# Patient Record
Sex: FEMALE | Race: OTHER | HISPANIC OR LATINO | Employment: FULL TIME | URBAN - METROPOLITAN AREA
[De-identification: names, ages, dates, MRNs, and addresses within clinical notes are randomized per-mention and may not be internally consistent; named-entity substitution may affect disease eponyms.]

---

## 2021-04-27 ENCOUNTER — APPOINTMENT (OUTPATIENT)
Dept: RADIOLOGY | Facility: MEDICAL CENTER | Age: 56
End: 2021-04-27
Payer: COMMERCIAL

## 2021-04-27 ENCOUNTER — OFFICE VISIT (OUTPATIENT)
Dept: URGENT CARE | Facility: MEDICAL CENTER | Age: 56
End: 2021-04-27
Payer: COMMERCIAL

## 2021-04-27 VITALS
HEIGHT: 63 IN | RESPIRATION RATE: 16 BRPM | OXYGEN SATURATION: 98 % | TEMPERATURE: 99.8 F | SYSTOLIC BLOOD PRESSURE: 162 MMHG | HEART RATE: 90 BPM | DIASTOLIC BLOOD PRESSURE: 90 MMHG

## 2021-04-27 DIAGNOSIS — M54.50 ACUTE BILATERAL LOW BACK PAIN WITHOUT SCIATICA: ICD-10-CM

## 2021-04-27 DIAGNOSIS — M54.50 ACUTE BILATERAL LOW BACK PAIN WITHOUT SCIATICA: Primary | ICD-10-CM

## 2021-04-27 LAB
SL AMB  POCT GLUCOSE, UA: NORMAL
SL AMB LEUKOCYTE ESTERASE,UA: NORMAL
SL AMB POCT BILIRUBIN,UA: NORMAL
SL AMB POCT BLOOD,UA: NORMAL
SL AMB POCT CLARITY,UA: CLEAR
SL AMB POCT COLOR,UA: YELLOW
SL AMB POCT KETONES,UA: NORMAL
SL AMB POCT NITRITE,UA: NORMAL
SL AMB POCT PH,UA: 6
SL AMB POCT SPECIFIC GRAVITY,UA: 1.01
SL AMB POCT URINE PROTEIN: NORMAL
SL AMB POCT UROBILINOGEN: 0.2

## 2021-04-27 PROCEDURE — 72100 X-RAY EXAM L-S SPINE 2/3 VWS: CPT

## 2021-04-27 PROCEDURE — 81002 URINALYSIS NONAUTO W/O SCOPE: CPT | Performed by: FAMILY MEDICINE

## 2021-04-27 PROCEDURE — 99213 OFFICE O/P EST LOW 20 MIN: CPT | Performed by: FAMILY MEDICINE

## 2021-04-27 RX ORDER — METHOCARBAMOL 500 MG/1
500 TABLET, FILM COATED ORAL 2 TIMES DAILY PRN
Qty: 20 TABLET | Refills: 0 | Status: SHIPPED | OUTPATIENT
Start: 2021-04-27

## 2021-04-27 NOTE — LETTER
April 27, 2021     Patient: Rose Abdalla   YOB: 1965   Date of Visit: 4/27/2021       To Whom It May Concern: It is my medical opinion that Rose Abdalla may return to work on 4/28/2021     If you have any questions or concerns, please don't hesitate to call           Sincerely,        Jadyn Mariscal MD    CC: No Recipients

## 2021-04-27 NOTE — PROGRESS NOTES
[facility]        NAME: Karyn Puente is a 55 y o  female  : 1965    MRN: [id number]  DATE: 2021  TIME: 4:09 PM    Assessment and Plan   Acute bilateral low back pain without sciatica [M54 5]  1  Acute bilateral low back pain without sciatica  POCT urine dip    XR spine lumbar 2 or 3 views injury    methocarbamol (ROBAXIN) 500 mg tablet         Patient Instructions       Follow up with PCP in 3-5 days  Proceed to  ER if symptoms worsen  Chief Complaint     Chief Complaint   Patient presents with    Back Pain     Pt c/o 10/10 back pain that began today when she woke up  Pt denies any injury  History of Present Illness        46-year-old female here today with acute severe low back pain that developed early this morning  She describes a centrally located, lumbar pain denies any numbness weakness of the lower extremity  Describes vague history of similar pain several months ago which resolved on its own  This morning, she took 1 Tylenol which helped reduce severity of pain  Describes a history of kidney stone diagnosed 4 5 years ago  Denies fever chills  Denies any recent heavy lifting or fall  However, 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  Review of Systems   Review of Systems   Musculoskeletal: Positive for arthralgias and back pain  Current Medications       Current Outpatient Medications:     methocarbamol (ROBAXIN) 500 mg tablet, Take 1 tablet (500 mg total) by mouth 2 (two) times a day as needed for muscle spasms, Disp: 20 tablet, Rfl: 0    Current Allergies     Allergies as of 2021    (No Known Allergies)            The following portions of the patient's history were reviewed and updated as appropriate: allergies, current medications, past family history, past medical history, past social history, past surgical history and problem list      History reviewed  No pertinent past medical history      History reviewed  No pertinent surgical history  No family history on file  Medications have been verified  Objective   /90   Pulse 90   Temp 99 8 °F (37 7 °C)   Resp 16   Ht 5' 3" (1 6 m)   SpO2 98%   No LMP recorded  Physical Exam     Physical Exam  Vitals signs and nursing note reviewed  Constitutional:       Appearance: Normal appearance  Abdominal:      General: Abdomen is flat  Bowel sounds are normal       Tenderness: There is no right CVA tenderness or left CVA tenderness  Musculoskeletal: Normal range of motion  Comments: LS spine: Flexion to 45 degrees, extension to 30 degrees, lateral rotation side bend 30 degrees  There is some tenderness upon palpation of the left sacroiliac joint  Extremities: Lower - good strength and tone, 5/5  Gait -normal    Neurological:      Mental Status: She is alert  Comments: Straight leg raise- negative    DTRs-2+

## 2022-02-13 ENCOUNTER — OFFICE VISIT (OUTPATIENT)
Dept: URGENT CARE | Age: 57
End: 2022-02-13
Payer: COMMERCIAL

## 2022-02-13 VITALS
RESPIRATION RATE: 20 BRPM | DIASTOLIC BLOOD PRESSURE: 90 MMHG | OXYGEN SATURATION: 98 % | HEIGHT: 66 IN | BODY MASS INDEX: 32.14 KG/M2 | SYSTOLIC BLOOD PRESSURE: 156 MMHG | WEIGHT: 200 LBS | TEMPERATURE: 97.8 F | HEART RATE: 74 BPM

## 2022-02-13 DIAGNOSIS — T78.40XA ALLERGIC REACTION, INITIAL ENCOUNTER: Primary | ICD-10-CM

## 2022-02-13 DIAGNOSIS — T78.3XXA ANGIOEDEMA, INITIAL ENCOUNTER: ICD-10-CM

## 2022-02-13 PROCEDURE — 99214 OFFICE O/P EST MOD 30 MIN: CPT | Performed by: PHYSICIAN ASSISTANT

## 2022-02-13 PROCEDURE — 96372 THER/PROPH/DIAG INJ SC/IM: CPT | Performed by: PHYSICIAN ASSISTANT

## 2022-02-13 RX ORDER — DIPHENHYDRAMINE HYDROCHLORIDE 50 MG/ML
50 INJECTION INTRAMUSCULAR; INTRAVENOUS ONCE
Status: DISCONTINUED | OUTPATIENT
Start: 2022-02-13 | End: 2022-02-13

## 2022-02-13 RX ORDER — METHYLPREDNISOLONE 4 MG/1
TABLET ORAL
Qty: 1 EACH | Refills: 0 | Status: SHIPPED | OUTPATIENT
Start: 2022-02-13

## 2022-02-13 RX ORDER — DIPHENHYDRAMINE HYDROCHLORIDE 50 MG/ML
50 INJECTION INTRAMUSCULAR; INTRAVENOUS ONCE
Status: COMPLETED | OUTPATIENT
Start: 2022-02-13 | End: 2022-02-13

## 2022-02-13 RX ORDER — METHYLPREDNISOLONE SODIUM SUCCINATE 125 MG/2ML
125 INJECTION, POWDER, LYOPHILIZED, FOR SOLUTION INTRAMUSCULAR; INTRAVENOUS ONCE
Status: COMPLETED | OUTPATIENT
Start: 2022-02-13 | End: 2022-02-13

## 2022-02-13 RX ADMIN — METHYLPREDNISOLONE SODIUM SUCCINATE 125 MG: 125 INJECTION, POWDER, LYOPHILIZED, FOR SOLUTION INTRAMUSCULAR; INTRAVENOUS at 15:33

## 2022-02-13 RX ADMIN — DIPHENHYDRAMINE HYDROCHLORIDE 50 MG: 50 INJECTION INTRAMUSCULAR; INTRAVENOUS at 15:33

## 2022-02-13 NOTE — PROGRESS NOTES
[facility]        NAME: Rose Abdalla is a 55 y o  female  : 1965    MRN: [id number]  DATE: 2022  TIME: 3:25 PM    Assessment and Plan   Allergic reaction, initial encounter [T78 40XA]  1  Allergic reaction, initial encounter  methylPREDNISolone sodium succinate (Solu-MEDROL) injection 125 mg    diphenhydrAMINE (BENADRYL) injection 50 mg    DISCONTINUED: diphenhydrAMINE (BENADRYL) injection 50 mg   2  Angioedema, initial encounter         Patient received Solu-Medrol 125mg and Benadryl 50mg IM while in clinic  Symptoms began to improve  ED if symptoms worsen  Patient Instructions       Follow up with PCP tomorrow for re evaluation  Proceed to  ER if symptoms worsen  Chief Complaint     Chief Complaint   Patient presents with    Lip Swelling     Lip Swelling x 2 days         History of Present Illness       Patient is a 55year old female [facility] with [facility] to upper lip  Patient reports eating Pineapple two days ago and swelling began shortly after  Pt took Benadryl upon initial notice of edema  Patient swelling has worsened through this morning  Patient denies any throat tightness, shortness of breath or difficulty with swallowing  No chest pain  Allergic Reaction  This is a new problem  The current episode started 2 days ago  The problem occurs constantly  The problem has been gradually worsening since onset  The problem is moderate  The patient was exposed to food (Pineapple)  Pertinent negatives include no abdominal pain, chest pain, coughing, difficulty breathing, drooling, eye redness, hyperventilation, rash, stridor, trouble swallowing, vomiting or wheezing  Swelling is present on the lips  Past treatments include diphenhydramine  The treatment provided no relief  Review of Systems   Review of Systems   Constitutional: Negative for chills and fever  HENT: Positive for sore throat   Negative for drooling, ear pain and trouble swallowing  Facial swelling: Lip swelling  Eyes: Negative for pain, redness and visual disturbance  Respiratory: Negative for cough, shortness of breath, wheezing and stridor  Cardiovascular: Negative for chest pain and palpitations  Gastrointestinal: Negative for abdominal pain and vomiting  Genitourinary: Negative for dysuria and hematuria  Musculoskeletal: Negative for arthralgias and back pain  Skin: Negative for color change and rash  Neurological: Negative for seizures and syncope  All other systems reviewed and are negative  Current Medications       Current Outpatient Medications:     methocarbamol (ROBAXIN) 500 mg tablet, Take 1 tablet (500 mg total) by mouth 2 (two) times a day as needed for muscle spasms, Disp: 20 tablet, Rfl: 0    Current Facility-Administered Medications:     diphenhydrAMINE (BENADRYL) injection 50 mg, 50 mg, Intramuscular, Once, Juana Garcia PA-C    methylPREDNISolone sodium succinate (Solu-MEDROL) injection 125 mg, 125 mg, Intramuscular, Once, Franklyn Shi PA-C    Current Allergies     Allergies as of 02/13/2022 - Reviewed 02/13/2022   Allergen Reaction Noted    Pineapple - food allergy Lip Swelling 02/13/2022            The following portions of the patient's history were reviewed and updated as appropriate: allergies, current medications, past family history, past medical history, past social history, past surgical history and problem list      History reviewed  No pertinent past medical history  History reviewed  No pertinent surgical history  History reviewed  No pertinent family history  Medications have been verified  Objective   /90   Pulse 74   Temp 97 8 °F (36 6 °C)   Resp 20   Ht 5' 6" (1 676 m)   Wt 90 7 kg (200 lb)   SpO2 98%   BMI 32 28 kg/m²   No LMP recorded  Physical Exam     Physical Exam  Constitutional:       Appearance: Normal appearance  HENT:      Head: Normocephalic and atraumatic  Nose: Nose normal       Mouth/Throat:      Mouth: Mucous membranes are moist      Eyes:      Extraocular Movements: Extraocular movements intact  Conjunctiva/sclera: Conjunctivae normal       Pupils: Pupils are equal, round, and reactive to light  Cardiovascular:      Rate and Rhythm: Normal rate  Pulmonary:      Effort: Pulmonary effort is normal    Musculoskeletal:         General: Normal range of motion  Cervical back: Normal range of motion and neck supple  Skin:     General: Skin is warm and dry  Capillary Refill: Capillary refill takes less than 2 seconds  Neurological:      General: No focal deficit present  Mental Status: She is alert and oriented to person, place, and time     Psychiatric:         Mood and Affect: Mood normal          Behavior: Behavior normal

## 2022-02-13 NOTE — LETTER
February 13, 2022     Patient: Savannah Lopez   YOB: 1965   Date of Visit: 2/13/2022       To Whom It May Concern: It is my medical opinion that Savannah Lopez should not return to work until 02/15/2022  If you have any questions or concerns, please don't hesitate to call           Sincerely,        Gustavo Chavarria PA-C    CC: No Recipients

## 2022-02-13 NOTE — PATIENT INSTRUCTIONS
Reacción alérgica general   LO QUE NECESITA SABER:   Nathaniel reacción alérgica es la respuesta de cross cuerpo a un alérgeno  Los alérgenos [employer], alimentos, picaduras de insectos, la caspa de los [place], el moho, el látex, los químicos y los ácaros del polvo  El polen de los [place], [place] césped y las hierbas malas también pueden causar nathaniel reacción alérgica  Nathaniel reacción alérgica puede variar de leve a severa  INSTRUCCIONES SOBRE [facility]:   Llame al 911 en reinaldo de presentar signos o síntomas de anafilaxia, echo dificultad para respirar, inflamación en cross boca o garganta, o sibilancias  También es posible que tenga comezón, sarpullido, urticaria o sienta que se va a desmayar  Regrese a la maria esther de emergencias si:  · Tiene sarpullido, urticaria, hinchazón o picazón está empezando a empeorar  · Cross garganta se siente apretada o kevin labios o lengua se inflaman  · Usted tiene dificultad para tragar o hablar  · Usted tiene náuseas, diarrea o calambres abdominales que [place], o tiene vómitos  · Usted tiene dolor o presión en el pecho  Comuníquese con cross médico si:  · Usted tiene preguntas o inquietudes acerca de cross condición o cuidado  Medicamentos: Es posible que usted necesite alguno de los siguientes:  · Los medicamentos para aliviar ciertos síntomas de las [place], echo la comezón, los estornudos y la inflamación  Usted los puede tessa en forma de píldora o de gotas en kevin ojos o nariz  Los tratamientos tópicos pueden darse para aplicarse directamente en cross piel para ayudar a disminuir la comezón o la inflamación  · La epinefrina podría recetarse si tiene riesgo de anafilaxia  Se trata de nathaniel reacción alérgica severa que puede ser potencialmente mortal  Cross médico le indicará si necesita llevar epinefrina con usted  Gale Browner cuándo y cómo usarla  · [place] kevin medicamentos echo se le haya indicado   Consulte con cross médico si usted mikal que cross medicamento no le está ayudando o si presenta efectos secundarios  Infórmele si es alérgico a algún medicamento  Mantenga nathaniel lista actualizada de los [place], las vitaminas y los productos herbales que roro  Incluya los siguientes datos de los medicamentos: cantidad, frecuencia y motivo de administración  Traiga con usted la lista o los envases de las píldoras a kevin citas de seguimiento  Lleve la lista de los medicamentos con usted en reinaldo de nathaniel emergencia  Acuda a la consulta de control con cross médico según las indicaciones: Anote kevin preguntas para que se acuerde de hacerlas chandra kevin visitas  El manejo de kevin síntomas:  · Evite los alérgenos  Es posible que necesite hacerse pruebas de alergias con cross médico o con un especialista para encontrar los alérgenos  · Use compresas frías en la piel o los ojos  Artemus ayudará a calmar la piel o los ojos afectados por la reacción alérgica  Puede hacer nathaniel compresa fría empapando un paño en agua fría  Escurra el exceso de agua antes de aplicar el paño  · Enjuague kevin fosas nasales con nathaniel solución salina  El enjuague diario puede ayudar a mantener cross nariz daniel de alérgenos  Use agua destilada, si es posible  Puede también hervir agua del grifo y [place] dejar que se enfríe antes de usarla  No use agua del grifo sin hervirla yolande  · No fume  La nicotina y otros químicos en los cigarrillos y cigarros pueden empeorar la reacción alérgica y también pueden causar daño a los pulmones  Pida información a cross médico si usted actualmente fuma y necesita ayuda para dejar de fumar  Los cigarrillos electrónicos o el tabaco sin humo igualmente contienen nicotina  Consulte con cross médico antes de [employer]  © Copyright [facility] 2021 Information is for End User's use only and may not be sold, redistributed or otherwise used for commercial purposes   All illustrations and images included in CareNotes® are the copyrighted property of [employer] , [employer]  or [employer] [facility]  Esta información es sólo para uso en educación  Cross intención no es darle un consejo médico sobre enfermedades o tratamientos  Colsulte con cross [employer] farmacéutico antes de seguir cualquier régimen médico para saber si es seguro y efectivo para usted

## 2022-10-03 ENCOUNTER — APPOINTMENT (OUTPATIENT)
Dept: LAB | Facility: CLINIC | Age: 57
End: 2022-10-03
Payer: COMMERCIAL

## 2022-10-03 ENCOUNTER — OFFICE VISIT (OUTPATIENT)
Dept: FAMILY MEDICINE CLINIC | Facility: CLINIC | Age: 57
End: 2022-10-03

## 2022-10-03 VITALS
WEIGHT: 186 LBS | HEIGHT: 66 IN | BODY MASS INDEX: 29.89 KG/M2 | HEART RATE: 85 BPM | SYSTOLIC BLOOD PRESSURE: 144 MMHG | RESPIRATION RATE: 16 BRPM | DIASTOLIC BLOOD PRESSURE: 86 MMHG | OXYGEN SATURATION: 99 % | TEMPERATURE: 97 F

## 2022-10-03 DIAGNOSIS — Z12.31 ENCOUNTER FOR SCREENING MAMMOGRAM FOR MALIGNANT NEOPLASM OF BREAST: ICD-10-CM

## 2022-10-03 DIAGNOSIS — Z12.11 COLON CANCER SCREENING: ICD-10-CM

## 2022-10-03 DIAGNOSIS — I10 HYPERTENSION, UNSPECIFIED TYPE: ICD-10-CM

## 2022-10-03 DIAGNOSIS — E66.9 OBESITY (BMI 30-39.9): ICD-10-CM

## 2022-10-03 DIAGNOSIS — L60.9 ABNORMALITY OF NAIL SURFACE: ICD-10-CM

## 2022-10-03 DIAGNOSIS — Z76.89 ENCOUNTER TO ESTABLISH CARE: Primary | ICD-10-CM

## 2022-10-03 DIAGNOSIS — J02.9 SORE THROAT: ICD-10-CM

## 2022-10-03 LAB
CHOLEST SERPL-MCNC: 165 MG/DL
CREAT UR-MCNC: 106 MG/DL
EST. AVERAGE GLUCOSE BLD GHB EST-MCNC: 123 MG/DL
HBA1C MFR BLD: 5.9 %
HDLC SERPL-MCNC: 41 MG/DL
LDLC SERPL CALC-MCNC: 99 MG/DL (ref 0–100)
MICROALBUMIN UR-MCNC: 113 MG/L (ref 0–20)
MICROALBUMIN/CREAT 24H UR: 107 MG/G CREATININE (ref 0–30)
TRIGL SERPL-MCNC: 124 MG/DL

## 2022-10-03 PROCEDURE — 82043 UR ALBUMIN QUANTITATIVE: CPT | Performed by: STUDENT IN AN ORGANIZED HEALTH CARE EDUCATION/TRAINING PROGRAM

## 2022-10-03 PROCEDURE — 99213 OFFICE O/P EST LOW 20 MIN: CPT | Performed by: FAMILY MEDICINE

## 2022-10-03 PROCEDURE — 36415 COLL VENOUS BLD VENIPUNCTURE: CPT

## 2022-10-03 PROCEDURE — 82570 ASSAY OF URINE CREATININE: CPT | Performed by: STUDENT IN AN ORGANIZED HEALTH CARE EDUCATION/TRAINING PROGRAM

## 2022-10-03 PROCEDURE — 83036 HEMOGLOBIN GLYCOSYLATED A1C: CPT

## 2022-10-03 PROCEDURE — 80061 LIPID PANEL: CPT

## 2022-10-03 RX ORDER — GUAIFENESIN 100 MG/5ML
200 SYRUP ORAL 3 TIMES DAILY PRN
Qty: 120 ML | Refills: 0 | Status: SHIPPED | OUTPATIENT
Start: 2022-10-03

## 2022-10-03 NOTE — PATIENT INSTRUCTIONS
CereVe Facial Moisturizer     La forma de tessa la presión arterial   LO QUE NECESITA SABER:   La presión arterial es la fuerza de la blanche empujando contra las saunders de las arterias  Los resultados de la presión arterial se escriben echo 2 números  El yolande, o número de [place], se llama presión arterial sistólica  Esta es la presión causada por el corazón que empuja la blanche hacia el cuerpo  El [place], o número de [place], es la presión arterial diastólica  Esta es la presión cuando el corazón se relaja y se llena de [place]  Pregúntele a cross médico cuál debe ser cross presión arterial  En la mayoría de las personas, nathaniel buena meta de presión arterial es menos de 120/80  INSTRUCCIONES SOBRE [facility]:   Llame a cross médico si:  Cross presión arterial está más elevada o más baja de lo que se le napoles indicado que debe estar  Usted tiene preguntas o inquietudes acerca de cross condición o cuidado  Por qué se debe tessa cross presión arterial: Es posible que usted no tenga ningún signo o síntoma de presión arterial jameson  Es posible que necesite tomarse la presión arterial regularmente para saber con qué frecuencia es jameson  La presión arterial jameson aumenta cross riesgo de derrame cerebral, ataque cardíaco o enfermedad renal  Es posible que necesite tessa un medicamento para mantener la presión arterial a un nivel normal  Anote y lleve un registro de cross presión arterial  Cross médico puede utilizar los [place] de la presión arterial de cross registro para abdiel si kevin medicamentos para la presión arterial están funcionando  La frecuencia que debe tessa cross presión arterial: Cross médico le puede recomendar que usted se tome la presión arterial al menos 2 veces al día  Tómese la presión arterial a la misma hora todos los días, por ejemplo nathaniel en la mañana y la otra en la noche   Pregúntele a cross médico cuándo y con qué frecuencia debería medir cross presión arterial   Cómo tomarse la presión arterial: Usted puede tomarse cross presión en casa con un monitor digital para la presión arterial o tensiómetro  Se recomienda leer las instrucciones que vienen con el tensiómetro  El monitor viene con un brazalete ajustable  Pregunte a cross médico si el brazalete es del tamaño correcto  No coma, cinda, fume o gaviota ejercicio chandra 30 minutos antes de tomarse la presión arterial     Siéntese y descanse por 5 minutos antes de tomarse la presión arterial     Siéntese con los pies planos en el piso y la espalda contra nathaniel silla  Extienda cross brazo y apóyelo en nathaniel superficie plana  Cross brazo debe estar a la misma altura que cross corazón  Asegúrese de que el manguito esté totalmente desinflado  Coloque el manguito de presión arterial contra la piel desnuda a aproximadamente 1 pulgada (2,5 cm) por encima del codo  Coloque la rivas alrededor del brazo de modo que [place]  La lectura de la presión arterial podría ser incorrecta si la abrazadera está muy floja o suelta  Si está usando nathaniel [place], envuelva el brazalete cómodamente alrededor de la [place] [address]  Mantenga la [place] [address] en el mismo nivel que cross corazón  Encienda el monitor de la presión arterial y Eason Marialuisa instrucciones  Anote la lectura de cross presión arterial, la fecha, la hora y en qué brazo se tomó la presión  Tómese la presión arterial 2 veces y escriba ambas lecturas  Use el mismo brazo cada vez  Estas lecturas pueden ser tomadas con 1 minuto de diferencia  [place] necesita saber:  No tome la presión arterial en un brazo lesionado, o si tiene nathaniel sonda intravenosa o nathaniel derivación  Tómese kevin medicamentos para la presión arterial echo se le indique  No deje de tessa kevin medicamentos si cross presión arterial está en cross valor objetivo  Si tiene la presión arterial en el valor objetivo significa que el medicamento está funcionando correctamente      Acuda a la consulta de control con cross médico según las indicaciones: Lleve el registro de kevin lecturas de presión arterial  Zetta Jero también el aparato para medir la presión arterial  Los médicos pueden comprobar que usted está usando el aparato correctamente  Anote kevin preguntas para que se acuerde de hacerlas chandra kevin visitas  © Copyright [address] 2022 Information is for End User's use only and may not be sold, redistributed or otherwise used for commercial purposes  All illustrations and images included in CareNotes® are the copyrighted property of A D A M , Inc  or [address] [place] [address] es sólo para uso en educación  Cross intención no es darle un consejo médico sobre enfermedades o tratamientos  Colsulte con cross Verlon Riley farmacéutico antes de seguir cualquier régimen médico para saber si es seguro y efectivo para usted

## 2022-10-03 NOTE — ASSESSMENT & PLAN NOTE
Abnormally thickened nailbeds on bilateral feet (please see media below)    - Ambulatory referral to Podiatry

## 2022-10-03 NOTE — ASSESSMENT & PLAN NOTE
-CRC screening: No personal or family history of colon cancer or colon polyps  No hx of colon cancer screening  Referral to GI for colonoscopy as per CDC guidelines   -BrCa screening: There is no personal or family history of breast cancer  She denies finding new breast lumps, breast pain or nipple discharge  Referral for mammogram at 51 as per USPSTF  -Cervical cancer screening: PAP performed 03/2020, ASCUS positive, recommendation to repeat pap in 2021   Patient to RTC for repeat screening    -Smoking hx: never smoker

## 2022-10-03 NOTE — ASSESSMENT & PLAN NOTE
/86 - currently above goal   Not currently on any pharmacotherapy    Microalb/Cr ratio: not on file, will order  Statin currently used: No  Current Alcohol Usage: Yes, occasionally  Current Cigarette smoker: No, never smoker   Currently Physical Active: only at work, works at [employer] with focus on low salt intake  Limit alcohol intake  - Encouraged home blood pressure monitoring  Provided patient with script for Blood pressure kit  Advised to take daily readings for 2 weeks and present with recorded values at follow up visit  - Reviewed BP goals with patient  Goal BP <140/90 per JNC 8 guidelines     - Will schedule patient for nursing visit for BP check in two weeks

## 2022-10-03 NOTE — PROGRESS NOTES
Assessment/Plan:    Encounter to establish care  -CRC screening: No personal or family history of colon cancer or colon polyps  No hx of colon cancer screening  Referral to GI for colonoscopy as per CDC guidelines   -BrCa screening: There is no personal or family history of breast cancer  She denies finding new breast lumps, breast pain or nipple discharge  Referral for mammogram at 51 as per USPSTF  -Cervical cancer screening: PAP performed 03/2020, ASCUS positive, recommendation to repeat pap in 2021  Patient to RTC for repeat screening    -Smoking hx: never smoker     High blood pressure  /86 - currently above goal   Not currently on any pharmacotherapy    Microalb/Cr ratio: not on file, will order  Statin currently used: No  Current Alcohol Usage: Yes, occasionally  Current Cigarette smoker: No, never smoker   Currently Physical Active: only at work, works at [employer] with focus on low salt intake  Limit alcohol intake  - Encouraged home blood pressure monitoring  Provided patient with script for Blood pressure kit  Advised to take daily readings for 2 weeks and present with recorded values at follow up visit  - Reviewed BP goals with patient  Goal BP <140/90 per JNC 8 guidelines  - Will schedule patient for nursing visit for BP check in two weeks      Abnormality of nail surface  Abnormally thickened nailbeds on bilateral feet (please see media below)    - Ambulatory referral to Podiatry       Return in about 2 weeks (around 10/17/2022) for Blood pressure   Diagnoses and all orders for this visit:    Encounter to establish care    Encounter for screening mammogram for malignant neoplasm of breast  -     Mammo screening bilateral w 3d & cad; Future    Hypertension, unspecified type  -     Microalbumin / creatinine urine ratio    Obesity (BMI 30-39 9)  -     Lipid Panel with Direct LDL reflex;  Future  -     Hemoglobin A1C; Future    Abnormality of nail surface  - Ambulatory Referral to Podiatry; Future    Colon cancer screening  -     Ambulatory referral for colonoscopy; Future    Sore throat  -     guaiFENesin (ROBITUSSIN) 100 MG/5ML oral liquid; Take 10 mL (200 mg total) by mouth 3 (three) times a day as needed for cough          Subjective:     Suzan Payan is a 65 y o  female who  has no past medical history on file  who presented to the office today to establish care  HPI      Patient presents from [facility] - she reports she has been traveling back and forth between the  and [facility] for several years  PMH: patient denies any history of chronic disease  Medications: None   Allergies: Pineapple, oral rash  Surg Hx: None   Social Hx: never smoker, no drugs, occasional alcohol   Fam Hx: Diabetes in mother       Current Concerns: thickened nail      Review of Systems   Constitutional: Negative for chills and fever  HENT: Negative for congestion, ear pain, rhinorrhea and sinus pain  Eyes: Negative for visual disturbance  Respiratory: Negative for chest tightness, shortness of breath and wheezing  Cardiovascular: Negative for chest pain and palpitations  Gastrointestinal: Negative for abdominal pain, constipation, diarrhea and vomiting  Endocrine: Negative for polyuria  Genitourinary: Negative for dysuria  Musculoskeletal: Negative for arthralgias and myalgias  Neurological: Negative for dizziness, syncope and light-headedness  Psychiatric/Behavioral: Negative for hallucinations, self-injury and suicidal ideas  Objective:    /86 (BP Location: Right arm, Patient Position: Sitting, Cuff Size: Large)   Pulse 85   Temp (!) 97 °F (36 1 °C) (Temporal)   Resp 16   Ht 5' 6" (1 676 m)   Wt 84 4 kg (186 lb)   SpO2 99%   Breastfeeding No   BMI 30 02 kg/m²     Physical Exam  Constitutional:       Appearance: Normal appearance  HENT:      Head: Normocephalic and atraumatic        Nose: Nose normal  No congestion or rhinorrhea  Mouth/Throat:      Mouth: Mucous membranes are moist       Pharynx: Oropharynx is clear  No oropharyngeal exudate or posterior oropharyngeal erythema  Eyes:      Conjunctiva/sclera: Conjunctivae normal    Cardiovascular:      Rate and Rhythm: Normal rate and regular rhythm  Heart sounds: Normal heart sounds  Pulmonary:      Effort: Pulmonary effort is normal       Breath sounds: Normal breath sounds  Abdominal:      General: Bowel sounds are normal    Musculoskeletal:         General: Normal range of motion  Cervical back: Normal range of motion  Neurological:      Mental Status: She is alert and oriented to person, place, and time  Psychiatric:         Mood and Affect: Mood normal          Behavior: Behavior normal          Thought Content:  Thought content normal          Judgment: Judgment normal              Mariana Mcgrath  10/03/22  5:43 PM

## 2022-10-09 ENCOUNTER — TELEPHONE (OUTPATIENT)
Dept: FAMILY MEDICINE CLINIC | Facility: CLINIC | Age: 57
End: 2022-10-09

## 2022-10-13 ENCOUNTER — ANNUAL EXAM (OUTPATIENT)
Dept: FAMILY MEDICINE CLINIC | Facility: CLINIC | Age: 57
End: 2022-10-13

## 2022-10-13 ENCOUNTER — APPOINTMENT (OUTPATIENT)
Dept: LAB | Facility: CLINIC | Age: 57
End: 2022-10-13
Payer: COMMERCIAL

## 2022-10-13 VITALS
DIASTOLIC BLOOD PRESSURE: 94 MMHG | WEIGHT: 188.4 LBS | HEIGHT: 66 IN | RESPIRATION RATE: 18 BRPM | SYSTOLIC BLOOD PRESSURE: 138 MMHG | TEMPERATURE: 98.2 F | BODY MASS INDEX: 30.28 KG/M2 | HEART RATE: 87 BPM | OXYGEN SATURATION: 98 %

## 2022-10-13 DIAGNOSIS — Z01.419 ENCOUNTER FOR GYNECOLOGICAL EXAMINATION WITHOUT ABNORMAL FINDING: Primary | ICD-10-CM

## 2022-10-13 DIAGNOSIS — Z11.3 SCREENING EXAMINATION FOR STD (SEXUALLY TRANSMITTED DISEASE): ICD-10-CM

## 2022-10-13 DIAGNOSIS — Z00.00 ANNUAL PHYSICAL EXAM: ICD-10-CM

## 2022-10-13 DIAGNOSIS — R73.03 PREDIABETES: ICD-10-CM

## 2022-10-13 DIAGNOSIS — I10 HYPERTENSION, UNSPECIFIED TYPE: ICD-10-CM

## 2022-10-13 LAB
ANION GAP SERPL CALCULATED.3IONS-SCNC: 5 MMOL/L (ref 4–13)
BUN SERPL-MCNC: 9 MG/DL (ref 5–25)
CALCIUM SERPL-MCNC: 9.5 MG/DL (ref 8.3–10.1)
CHLORIDE SERPL-SCNC: 103 MMOL/L (ref 96–108)
CO2 SERPL-SCNC: 27 MMOL/L (ref 21–32)
CREAT SERPL-MCNC: 0.8 MG/DL (ref 0.6–1.3)
GFR SERPL CREATININE-BSD FRML MDRD: 82 ML/MIN/1.73SQ M
GLUCOSE P FAST SERPL-MCNC: 93 MG/DL (ref 65–99)
HAV IGM SER QL: NORMAL
HBV CORE IGM SER QL: NORMAL
HBV SURFACE AG SER QL: NORMAL
HCV AB SER QL: NORMAL
POTASSIUM SERPL-SCNC: 4.2 MMOL/L (ref 3.5–5.3)
SODIUM SERPL-SCNC: 135 MMOL/L (ref 135–147)

## 2022-10-13 PROCEDURE — 87591 N.GONORRHOEAE DNA AMP PROB: CPT

## 2022-10-13 PROCEDURE — 86592 SYPHILIS TEST NON-TREP QUAL: CPT

## 2022-10-13 PROCEDURE — 80048 BASIC METABOLIC PNL TOTAL CA: CPT

## 2022-10-13 PROCEDURE — 87491 CHLMYD TRACH DNA AMP PROBE: CPT

## 2022-10-13 PROCEDURE — 80074 ACUTE HEPATITIS PANEL: CPT

## 2022-10-13 PROCEDURE — G0145 SCR C/V CYTO,THINLAYER,RESCR: HCPCS | Performed by: STUDENT IN AN ORGANIZED HEALTH CARE EDUCATION/TRAINING PROGRAM

## 2022-10-13 PROCEDURE — 36415 COLL VENOUS BLD VENIPUNCTURE: CPT

## 2022-10-13 PROCEDURE — G0476 HPV COMBO ASSAY CA SCREEN: HCPCS | Performed by: STUDENT IN AN ORGANIZED HEALTH CARE EDUCATION/TRAINING PROGRAM

## 2022-10-13 PROCEDURE — 87389 HIV-1 AG W/HIV-1&-2 AB AG IA: CPT

## 2022-10-13 RX ORDER — LISINOPRIL 10 MG/1
10 TABLET ORAL DAILY
Qty: 90 TABLET | Refills: 0 | Status: SHIPPED | OUTPATIENT
Start: 2022-10-13

## 2022-10-13 NOTE — ASSESSMENT & PLAN NOTE
Continue same  Finish up meds  Improving slowly  Pt still refusing any tests   RTC in 1 week Patient presents with home blood pressure values which have ranged between 475-521 systolic and  diastolic  Blood pressure readings were taken while patient was seated and relaxed at various times throughout the day  Will initiate trial of Lisinopril 10 mg QD with plans to titrate as needed  Will check BMP in 1 month  RTC in 1 month for HTN f/u

## 2022-10-13 NOTE — ASSESSMENT & PLAN NOTE
HA1C (10/3/22) 5 9, in prediabetic range  Last microalbumin/creatinine ratio was on 10/3/22 and elevated at 107  BP persistently elevated      -Will start ACE daily for renal protection   -Repeat Test for microalbuminuria in 3 months   -Follow up in 4 months for diabetes check up and repeat A1C  -Additional information, including diet and exercise recommendations, provided in AVS

## 2022-10-13 NOTE — PATIENT INSTRUCTIONS
Plan de alimentación con "enfoque dietético para detener la hipertensión” (DASH, por kevin siglas en inglés)   LO QUE NECESITA SABER:   El plan de alimentación DASH está diseñado para ayudar a prevenir o disminuir la hipertensión  También puede ayudar a bajar el colesterol guero (colesterol LDL) y disminuir cross riesgo de enfermedad cardíaca  El plan es bajo en sodio, azúcar, grasas dañinas, y grasas en cross totalidad  Es alto en potasio, calcio, magnesio y [place]  Estos nutrientes se agregan al consumir más frutas, vegetales y granos enteros  Con el plan de alimentación DASH, usted necesita consumir un número específico de porciones de cada henry de alimentos  [place] le ayudará a consumir las cantidades suficientes de ciertos nutrientes y limitar otros  La cantidad de porciones que usted debe comer depende de la cantidad de calorías que usted necesita  Cross dietista puede ayudarlo a crear planes de comidas con la cantidad [place] de porciones para cada henry de alimentos  INSTRUCCIONES SOBRE EL [facility]ITAL[facility]:   Lo que necesita saber acerca del sodio: Cross dietista le indicará la cantidad de sodio que usted debe consumir a diario  La gente que tiene la presión arterial jameson debe consumir de 1,500 a 2,300 mg de sodio al día echo eran  Nathaniel cucharadita (cdta) de sal tiene 2,300 mg de sodio  [place] puede parecer echo nathaniel meta difícil, akash pequeños cambios en los alimentos que usted consume pueden hacer nathaniel gran diferencia  Cross médico o dietista puede ayudarlo a crear un plan alimenticio que cumpla cross límite de sodio  Celina las etiquetas de los alimentos  Las etiquetas pueden ayudarle a escoger alimentos bajos en sodio  La cantidad de sodio está incluida en miligramos (mg)  La columna del porcentaje de valor diario indica la cantidad de necesidades diarias satisfechas con 1 porción del alimento para cada nutriente en la lista  Escoja alimentos que tengan menos de 5% del porcentaje diario de [place]   Estos alimentos se consideran bajos en sodio  Los alimentos que tienen 20% o más del porcentaje diario de sodio se consideran alimentos altos en sodio  Evite alimentos que tengan más de 300 mg de sodio por porción  Escoja alimentos Zenon Dragon diga que son bajos en sodio, con sodio reducido, o sin sal agregada  Limite la sal agregada  No sale la comida en la adan si se añade sal al cocinar  Use hierbas y condimentos, echo cebollas, ajo y especias sin sal para agregar sabor  Use jugo de lima, carole o vinagre para agregar un sabor ácido  Use chiles picantes o nathaniel cantidad pequeña de salsa picante para agregar un sabor picante  Limite los alimentos con alto contenido de sal agregada, echo los siguientes:    Condimentos hechos con sal, echo sal de ajo, sal de apio, sal de cebolla, sal condimentada, suavizantes para rolando, y glutamato de monosodio (MSG, por kevin siglas en inglés)    Sopa Miso y mezclas para sopa enlatadas o secas    Salsa de soya regular, salsa de [address], [address] [facility], salsa para [facility], [address] [facility], y la mayoría de las vinagres con sabor    Alimentos para merendar, echo mario tostadas, palomitas de [place], pretzels, piel de cerdo, galletas de soda [facility], y nueces [employer], echo cenas, entradas, vegetales en salsa, y rolando [employer] sustitutos para la sal  Pregúntele a cross médico si es posible usar sustitutos de la sal  Algunos sustitutos de la sal vienen con ingredientes que pueden ser dañinos si usted tiene ciertos padecimientos médicos  Escoja los alimentos cuidadosamente cuando sale a comer a restaurantes: las comidas de los restaurantes, sobre todo restaurantes de comida rápida, rosemary siempre son altas en sodio  Algunos restaurantes ofrecen información nutricional que indica la cantidad de sodio en kevin alimentos   Pida que preparen kevin comidas con menos sal o sin sal     Lo que necesita saber acerca de las grasas: Las grasas insaturadas y los ácidos grasos omega-3 son ejemplos de grasas saludables  Las grasas no saludables incluyen las grasas saturadas y las grasas trans  Incluya grasas saludables, echo las siguientes:     Aceites de cocina, echo el de soja, canola, brito o girasol    Pescados grasos, echo el salmón, el atún, la caballa o las dany    Aceite de linaza o linaza molida    ½ taza de frijoles cocidos, echo frijoles negros, frijoles rojos o frijoles pintos    1½ onzas de kenny secos bajos en sodio, echo almendras o nueces    [place] de maní baja en azúcar y sodio    [place], echo las de chía o girasol       Limite o no consuma grasas poco saludables, echo los siguientes:     Alimentos que contienen grasa de origen animal, echo las rolando grasas, la [place], la [place] y la crema    Agia Thekla, margarina en consuelo, aceite de park y aceite de иван     Aderezo de ensalada completo o cremoso    Sopa cremosa    Galletas, mario fritas y productos de panadería elaborados con margarina o manteca    Alimentos fritos con grasas poco saludables    Salsa de carne y salsas, echo la [place] o la de queso    Lo que necesita saber acerca de los carbohidratos: Todos los carbohidratos se descomponen en azúcar  Los carbohidratos complejos contienen más fibra que los simples  [place] significa que los carbohidratos complejos entran en el torrente sanguíneo más lentamente y causan menos picos de azúcar en la blanche  Intenta incluir más carbohidratos complejos y menos carbohidratos simples    Incluya carbohidratos complejos, echo los siguientes:     1 tajada de pan integral    1 onza de cereal seco que no contenga azúcar añadida    ½ taza de alexsander cocida    2 onzas de pasta integral cocida    ½ taza de arroz integral cocido    Limite o no consuma carbohidratos simples, echo los siguientes:     Productos horneados, echo rosquillas, pasteles y galletas    Mezclas para pan de maíz y galletas    Arroz arita y mezclas de pasta, echo los [place] con queso de caja    Cereales instantáneos y fríos que contienen azúcar    Asher Penning y helado que contienen azúcar    Condimentos, echo el ketchup    Bebidas con alto contenido en azúcar, echo refrescos, limonadas y jugos de frutas    Lo que usted necesita saber sobre las verduras y las frutas: Las verduras y las frutas pueden ser frescas, congeladas o enlatadas  Si es posible, trate de elegir opciones enlatadas bajas en sodio  Incluya nathaniel variedad de verduras y frutas, echo las siguientes:     1 Corpus leroy, anjali o melocotón medianos (aproximadamente ½ taza picada)    ½ banana pequeña    ½ taza de bayas, echo arándanos, fresas o moras    1 taza de verduras de hoja ladi crudas, echo [place], espinacas, col rizada o berza    ½ taza de verduras congeladas o enlatadas (sin sal agregada), echo judías verdes    ½ taza de fruta fresca, congelada o enlatada (enlatada en sirope liviano o jugo de fruta)    ½ taza de jugo de verduras o frutas    Limite o no consuma verduras y frutas elaboradas de las siguientes maneras:     [place] congelada, echo las cerezas, con azúcar añadida    Frutas en crema o salsa de [place]    Las verduras enlatadas son altas en sodio  Sauerkraut, vegetales en escabeche, y otros alimentos preparados con vinagre    Vegetales fritos o vegetales en mantequ[place] o salsas altas en grasas    Lo que necesita saber acerca de los alimentos con proteína:   Incluya alimentos proteicos magros o bajos en grasa, echo los siguientes:     Joanne evangelist (maryellen, Hussein) sin piel    Pescado (sobre todo pescado con grasa, echo salmón, atún fresco o caballa)    [place] de res o de cerdo magra (fern, carne molida extra Abraham Ruth)    Claras de [place] y sustitutos del huevo    1 taza de leche descremada o leche 1%    1½ onzas de queso descremado o bajo en grasas    6 onzas de yogurt descremado o bajo en grasas    Limite o no consuma alimentos con alto contenido de proteínas, [facility] siguientes     [address] o [place], echo carne preparada con maíz, tocineta, jamón, perros calientes, y salchichas    Frijoles enlatados y joanne enlatadas o en pasta, echo joanne en conserva, dany, anchoas y [address] de [address] para emparedado, echo [place], [place], [place], y carne en rebanada    Joanne altas en grasas (biste estilo T-bone, carne molida para hamburguesas, costillas)    Huevos enteros y yemas de [place]    Leche [place], [place] al 2% y crema    Queso normal y queso fundido    Otras pautas que debe seguir:  Mantenga un peso saludable  Cross riesgo de enfermedad cardíaca es aún más alto si usted tiene sobrepeso  Cross médico podría sugerirle que adelgace si tiene sobrepeso  Usted puede perder peso si se propone consumir menos calorías y alimentos que tengan azúcar y grasas agregadas  El plan de alimentación DASH puede ayudarle a lograrlo  Jania buena forma de disminuir el consumo de calorías es consumiendo porciones más pequeñas en cada comida y menos meriendas entre comidas  Consulte a cross médico para obtener más información sobre cómo adelgazar  Realice actividad física con regularidad  El ejercicio regular puede ayudarle a alcanzar o mantener un peso saludable  El ejercicio regular también puede ayudarle a disminuir cross presión arterial y mejorar kevin niveles de colesterol  Duglas ejercicios moderados por 30 minutos o más todos los días de la [place]  Para bajar peso, asegúrese de ejercitarse por lo menos 60 minutos  Consulte con cross médico sobre un programa de ejercicio adecuado para usted  Limite el consumo de alcohol  Las mujeres deberían limitar el consumo de alcohol a 1 bebida por día  Los hombres deberían limitar el consumo de alcohol a 2 tragos al día  Un trago equivale a 12 onzas de cerveza, 5 onzas de vino o 1 onza y ½ de licor  Para más información:  [facility] and [id number]  [place]eth [place]ke[place] [id number]  Phone: [phone]  Web Address: ItCheaper no    © Copyright [employer] 2022 Information is for End User's use only and may not be sold, redistributed or otherwise used for commercial purposes  All illustrations and images included in CareNotes® are the copyrighted property of [employer]  or [address] [place] [address] es sólo para uso en educación  Cross intención no es darle un consejo médico sobre enfermedades o tratamientos  Colsulte con cross Alyssia Parker farmacéutico antes de seguir cualquier régimen médico para saber si es seguro y efectivo para usted

## 2022-10-13 NOTE — ASSESSMENT & PLAN NOTE
Normal well-woman GYN exam   Pap smear done with HPV co-testing reflex  PE without any abnormal findings  Discussed onset of menopause and expectant management  Will order STD panel given patient concerns over infidelity (HIV, HEP Panel, RPR, GC, Chlamydia)

## 2022-10-13 NOTE — PROGRESS NOTES
ANNUAL GYNECOLOGICAL EXAMINATION      Briana Hernandez is a 65 y o  female who presents today for annual GYN exam   Her last pap smear was performed  and result was significant for ASCUS  She reports no prior history of abnormal pap smears piror to this  No contraceptive method  Currently sexually active with partner of over 20 years  Cannot be certain there have not been any episodes of infidelity  Patient is requesting STD screening at this time  She denies vaginal bleeding/discharge/odor  She denies burning/itching to the vagina  She denies personal or family history of breast, ovarian, vaginal or cervical cancer  LMP: 3 months ago  Period are irregular  Patient is aware she is nearing menopause  Patient reports often having hot flashes  OB history:  (one miscarriage)       No past medical history on file  No past surgical history on file  OB History    No obstetric history on file  Social History     Tobacco Use   • Smoking status: Never Smoker   • Smokeless tobacco: Never Used   Substance Use Topics   • Alcohol use: Never   • Drug use: Never     Cancer-related family history is not on file  Current Outpatient Medications:   •  lisinopril (ZESTRIL) 10 mg tablet, Take 1 tablet (10 mg total) by mouth daily, Disp: 90 tablet, Rfl: 0  •  metFORMIN (GLUCOPHAGE) 500 mg tablet, Take 0 5 tablets (250 mg total) by mouth daily with breakfast for 7 days, THEN 1 tablet (500 mg total) daily with breakfast for 7 days, THEN 1 tablet (500 mg total) 2 (two) times a day with meals  , Disp: 191 tablet, Rfl: 0  •  guaiFENesin (ROBITUSSIN) 100 MG/5ML oral liquid, Take 10 mL (200 mg total) by mouth 3 (three) times a day as needed for cough, Disp: 120 mL, Rfl: 0  •  methocarbamol (ROBAXIN) 500 mg tablet, Take 1 tablet (500 mg total) by mouth 2 (two) times a day as needed for muscle spasms, Disp: 20 tablet, Rfl: 0  •  methylPREDNISolone 4 MG tablet therapy pack, Use as directed on package, Disp: 1 each, Rfl: 0    Review of Systems:  Review of Systems   Constitutional: Negative for chills and fever  HENT: Negative for congestion, ear pain, rhinorrhea and sinus pain  Eyes: Negative for visual disturbance  Respiratory: Negative for chest tightness, shortness of breath and wheezing  Cardiovascular: Negative for chest pain and palpitations  Gastrointestinal: Negative for abdominal pain, constipation, diarrhea and vomiting  Endocrine: Negative for polyuria  Genitourinary: Negative for dysuria  Musculoskeletal: Negative for arthralgias and myalgias  Neurological: Negative for dizziness, syncope and light-headedness  Psychiatric/Behavioral: Negative for hallucinations, self-injury and suicidal ideas  Physical Exam:  Physical Exam  Constitutional:       Appearance: Normal appearance  HENT:      Head: Normocephalic and atraumatic  Nose: Nose normal    Eyes:      Conjunctiva/sclera: Conjunctivae normal    Cardiovascular:      Rate and Rhythm: Normal rate  Pulmonary:      Effort: Pulmonary effort is normal    Musculoskeletal:         General: Normal range of motion  Cervical back: Normal range of motion  Skin:     General: Skin is warm and dry  Neurological:      Mental Status: She is alert and oriented to person, place, and time  Psychiatric:         Behavior: Behavior normal        Plan & Assessment:    Prediabetes  HA1C (10/3/22) 5 9, in prediabetic range  Last microalbumin/creatinine ratio was on 10/3/22 and elevated at 107  BP persistently elevated  -Will start ACE daily for renal protection   -Repeat Test for microalbuminuria in 3 months   -Follow up in 4 months for diabetes check up and repeat A1C  -Additional information, including diet and exercise recommendations, provided in AVS     High blood pressure  Patient presents with home blood pressure values which have ranged between 885-612 systolic and  diastolic   Blood pressure readings were taken while patient was seated and relaxed at various times throughout the day  Will initiate trial of Lisinopril 10 mg QD with plans to titrate as needed  Will check BMP in 1 month  RTC in 1 month for HTN f/u  Encounter for gynecological examination without abnormal finding  Normal well-woman GYN exam   Pap smear done with HPV co-testing reflex  PE without any abnormal findings  Discussed onset of menopause and expectant management  Will order STD panel given patient concerns over infidelity (HIV, HEP Panel, RPR, GC, Chlamydia)

## 2022-10-14 LAB
C TRACH DNA SPEC QL NAA+PROBE: NEGATIVE
HIV 1+2 AB+HIV1 P24 AG SERPL QL IA: NORMAL
HPV HR 12 DNA CVX QL NAA+PROBE: POSITIVE
HPV16 DNA CVX QL NAA+PROBE: NEGATIVE
HPV18 DNA CVX QL NAA+PROBE: NEGATIVE
N GONORRHOEA DNA SPEC QL NAA+PROBE: NEGATIVE
RPR SER QL: NORMAL

## 2022-10-20 ENCOUNTER — APPOINTMENT (OUTPATIENT)
Dept: LAB | Facility: CLINIC | Age: 57
End: 2022-10-20
Payer: COMMERCIAL

## 2022-10-20 ENCOUNTER — CLINICAL SUPPORT (OUTPATIENT)
Dept: FAMILY MEDICINE CLINIC | Facility: CLINIC | Age: 57
End: 2022-10-20

## 2022-10-20 VITALS — DIASTOLIC BLOOD PRESSURE: 102 MMHG | SYSTOLIC BLOOD PRESSURE: 158 MMHG | HEART RATE: 87 BPM | OXYGEN SATURATION: 99 %

## 2022-10-20 DIAGNOSIS — L60.3 MEDIAN CANALIFORM NAIL DYSTROPHY: ICD-10-CM

## 2022-10-20 DIAGNOSIS — I10 HYPERTENSION, UNSPECIFIED TYPE: Primary | ICD-10-CM

## 2022-10-20 LAB
ALBUMIN SERPL BCP-MCNC: 3.8 G/DL (ref 3.5–5)
ALP SERPL-CCNC: 113 U/L (ref 46–116)
ALT SERPL W P-5'-P-CCNC: 30 U/L (ref 12–78)
AST SERPL W P-5'-P-CCNC: 21 U/L (ref 5–45)
BILIRUB DIRECT SERPL-MCNC: 0.09 MG/DL (ref 0–0.2)
BILIRUB SERPL-MCNC: 0.45 MG/DL (ref 0.2–1)
PROT SERPL-MCNC: 9 G/DL (ref 6.4–8.4)

## 2022-10-20 PROCEDURE — 80076 HEPATIC FUNCTION PANEL: CPT

## 2022-10-21 LAB
LAB AP GYN PRIMARY INTERPRETATION: NORMAL
Lab: NORMAL

## 2022-11-03 ENCOUNTER — CLINICAL SUPPORT (OUTPATIENT)
Dept: FAMILY MEDICINE CLINIC | Facility: CLINIC | Age: 57
End: 2022-11-03

## 2022-11-03 VITALS
OXYGEN SATURATION: 98 % | TEMPERATURE: 97.8 F | DIASTOLIC BLOOD PRESSURE: 86 MMHG | HEART RATE: 81 BPM | SYSTOLIC BLOOD PRESSURE: 154 MMHG

## 2022-11-03 DIAGNOSIS — I10 HYPERTENSION, UNSPECIFIED TYPE: Primary | ICD-10-CM

## 2022-11-10 ENCOUNTER — CLINICAL SUPPORT (OUTPATIENT)
Dept: FAMILY MEDICINE CLINIC | Facility: CLINIC | Age: 57
End: 2022-11-10

## 2022-11-10 VITALS — HEART RATE: 72 BPM | DIASTOLIC BLOOD PRESSURE: 90 MMHG | SYSTOLIC BLOOD PRESSURE: 142 MMHG

## 2022-11-10 DIAGNOSIS — I10 HYPERTENSION, UNSPECIFIED TYPE: Primary | ICD-10-CM

## 2022-11-14 ENCOUNTER — OFFICE VISIT (OUTPATIENT)
Dept: FAMILY MEDICINE CLINIC | Facility: CLINIC | Age: 57
End: 2022-11-14

## 2022-11-14 VITALS
OXYGEN SATURATION: 98 % | HEART RATE: 78 BPM | DIASTOLIC BLOOD PRESSURE: 80 MMHG | BODY MASS INDEX: 29.38 KG/M2 | RESPIRATION RATE: 16 BRPM | TEMPERATURE: 98.7 F | WEIGHT: 182 LBS | SYSTOLIC BLOOD PRESSURE: 112 MMHG

## 2022-11-14 DIAGNOSIS — I10 PRIMARY HYPERTENSION: Primary | ICD-10-CM

## 2022-11-14 DIAGNOSIS — R05.3 CHRONIC COUGH: ICD-10-CM

## 2022-11-14 RX ORDER — BENZONATATE 200 MG/1
200 CAPSULE ORAL 3 TIMES DAILY PRN
Qty: 20 CAPSULE | Refills: 0 | Status: SHIPPED | OUTPATIENT
Start: 2022-11-14

## 2022-11-14 RX ORDER — LORATADINE 10 MG/1
10 TABLET ORAL DAILY
Qty: 30 TABLET | Refills: 1 | Status: SHIPPED | OUTPATIENT
Start: 2022-11-14

## 2022-11-14 NOTE — PROGRESS NOTES
Name: Katharine Berrios      : 1965      MRN: [id number]  Encounter Provider: Radha Scott MD  Encounter Date: 2022   Encounter department: [address] [place] [address]     1  Primary hypertension  Assessment & Plan:  Controlled  Reviewed BP goals with patient  Goal BP <140/90 per JNC 8 guidelines  Patient congratulated on efforts  Continue to maintain healthy balanced diet with focus on low salt intake  Limit alcohol intake  Additional information provided in AVS    Lisinopril 10 mg initiated at last visit  Will check BMP at this time  2  Chronic cough  Assessment & Plan:  More than one year duration  Will trial Claritin and Tessalon until patient returns in 2 weeks for more thorough evaluation  Will rule out GERD and red flag symptoms at follow up visit  Orders:  -     benzonatate (TESSALON) 200 MG capsule; Take 1 capsule (200 mg total) by mouth 3 (three) times a day as needed for cough  -     loratadine (CLARITIN) 10 mg tablet; Take 1 tablet (10 mg total) by mouth daily     RTC for evaluation of chronic cough    Subjective     This is a very pleasant 65 y o  female who presents to the clinic for management of their chronic medical conditions  Patient's medical conditions are stable unless noted otherwise above  Patient has not had any recent hospitalizations, or medical emergencies since last visit  Patient has no further complaints other than what is mentioned in the ROS  Review of Systems   Constitutional: Negative for chills and fever  HENT: Negative for congestion, ear pain, rhinorrhea and sinus pain  Eyes: Negative for visual disturbance  Respiratory: Negative for chest tightness, shortness of breath and wheezing  Cardiovascular: Negative for chest pain and palpitations  Gastrointestinal: Negative for abdominal pain, constipation, diarrhea and vomiting  Endocrine: Negative for polyuria     Genitourinary: Negative for dysuria  Musculoskeletal: Negative for arthralgias and myalgias  Neurological: Negative for dizziness, syncope and light-headedness  Psychiatric/Behavioral: Negative for hallucinations, self-injury and suicidal ideas  Past Medical History:   Diagnosis Date   • Hypertension      No past surgical history on file  No family history on file  Social History     Socioeconomic History   • Marital status: Single     Spouse name: None   • Number of children: None   • Years of education: None   • Highest education level: None   Occupational History   • None   Tobacco Use   • Smoking status: Never Smoker   • Smokeless tobacco: Never Used   Substance and Sexual Activity   • Alcohol use: Never   • Drug use: Never   • Sexual activity: None   Other Topics Concern   • None   Social History Narrative   • None     Social Determinants of Health     Financial Resource Strain: Low Risk    • Difficulty of Paying Living Expenses: Not hard at all   Food Insecurity: No Food Insecurity   • Worried About Running Out of Food in the Last Year: Never true   • Ran Out of Food in the Last Year: Never true   Transportation Needs: No Transportation Needs   • Lack of Transportation (Medical): No   • Lack of Transportation (Non-Medical): No   Physical Activity: Not on file   Stress: Not on file   Social Connections: Not on file   Intimate Partner Violence: Not on file   Housing Stability: Not on file     Current Outpatient Medications on File Prior to Visit   Medication Sig   • guaiFENesin (ROBITUSSIN) 100 MG/5ML oral liquid Take 10 mL (200 mg total) by mouth 3 (three) times a day as needed for cough   • lisinopril (ZESTRIL) 10 mg tablet Take 1 tablet (10 mg total) by mouth daily   • metFORMIN (GLUCOPHAGE) 500 mg tablet Take 0 5 tablets (250 mg total) by mouth daily with breakfast for 7 days, THEN 1 tablet (500 mg total) daily with breakfast for 7 days, THEN 1 tablet (500 mg total) 2 (two) times a day with meals     • methocarbamol (ROBAXIN) 500 mg tablet Take 1 tablet (500 mg total) by mouth 2 (two) times a day as needed for muscle spasms   • methylPREDNISolone 4 MG tablet therapy pack Use as directed on package     Allergies   Allergen Reactions   • Pineapple - Food Allergy Lip Swelling     Immunization History   Administered Date(s) Administered   • COVID-19 PFIZER VACCINE 0 3 ML IM 11/12/2021, 12/03/2021       Objective     /80 (BP Location: Right arm, Patient Position: Sitting, Cuff Size: Large)   Pulse 78   Temp 98 7 °F (37 1 °C) (Temporal)   Resp 16   Wt 82 6 kg (182 lb)   SpO2 98%   Breastfeeding No   BMI 29 38 kg/m²     Physical Exam  Constitutional:       Appearance: Normal appearance  HENT:      Head: Normocephalic and atraumatic  Nose: Nose normal    Eyes:      Conjunctiva/sclera: Conjunctivae normal    Cardiovascular:      Rate and Rhythm: Normal rate  Pulmonary:      Effort: Pulmonary effort is normal    Musculoskeletal:         General: Normal range of motion  Cervical back: Normal range of motion  Skin:     General: Skin is warm and dry  Neurological:      Mental Status: She is alert and oriented to person, place, and time     Psychiatric:         Behavior: Behavior normal        Mariana Mcgrath MD

## 2022-11-14 NOTE — ASSESSMENT & PLAN NOTE
Controlled  Reviewed BP goals with patient  Goal BP <140/90 per JNC 8 guidelines  Patient congratulated on efforts  Continue to maintain healthy balanced diet with focus on low salt intake  Limit alcohol intake  Additional information provided in AVS    Lisinopril 10 mg initiated at last visit  Will check BMP at this time

## 2022-11-14 NOTE — PATIENT INSTRUCTIONS
Plan de alimentación con "enfoque dietético para detener la hipertensión” (DASH, por kevin siglas en inglés)   LO QUE NECESITA SABER:   El plan de alimentación DASH está diseñado para ayudar a prevenir o disminuir la hipertensión  También puede ayudar a bajar el colesterol guero (colesterol LDL) y disminuir cross riesgo de enfermedad cardíaca  El plan es bajo en sodio, azúcar, grasas dañinas, y grasas en cross totalidad  Es alto en potasio, calcio, magnesio y [place]  Estos nutrientes se agregan al consumir más frutas, vegetales y granos enteros  Con el plan de alimentación DASH, usted necesita consumir un número específico de porciones de cada henry de alimentos  [place] le ayudará a consumir las cantidades suficientes de ciertos nutrientes y limitar otros  La cantidad de porciones que usted debe comer depende de la cantidad de calorías que usted necesita  Cross dietista puede ayudarlo a crear planes de comidas con la cantidad [place] de porciones para cada henry de alimentos  INSTRUCCIONES SOBRE [facility]:   Lo que necesita saber acerca del sodio: Cross dietista le indicará la cantidad de sodio que usted debe consumir a diario  La gente que tiene la presión arterial jameson debe consumir de 1,500 a 2,300 mg de sodio al día echo eran  Nathaniel cucharadita (cdta) de sal tiene 2,300 mg de sodio  [place] puede parecer echo nathaniel meta difícil, akash pequeños cambios en los alimentos que usted consume pueden hacer nathaniel gran diferencia  Cross médico o dietista puede ayudarlo a crear un plan alimenticio que cumpla cross límite de sodio  Celina las etiquetas de los alimentos  Las etiquetas pueden ayudarle a escoger alimentos bajos en sodio  La cantidad de sodio está incluida en miligramos (mg)  La columna del porcentaje de valor diario indica la cantidad de necesidades diarias satisfechas con 1 porción del alimento para cada nutriente en la lista  Escoja alimentos que tengan menos de 5% del porcentaje diario de [place]   Estos alimentos se consideran bajos en sodio  Los alimentos que tienen 20% o más del porcentaje diario de sodio se consideran alimentos altos en sodio  Evite alimentos que tengan más de 300 mg de sodio por porción  Escoja alimentos [place] navarretea que son bajos en sodio, con sodio reducido, o sin sal agregada  Limite la sal agregada  No sale la comida en la adan si se añade sal al cocinar  Use hierbas y condimentos, echo cebollas, ajo y especias sin sal para agregar sabor  Use jugo de lima, carole o vinagre para agregar un sabor ácido  Use chiles picantes o nathaniel cantidad pequeña de salsa picante para agregar un sabor picante  Limite los alimentos con alto contenido de sal agregada, echo los siguientes:    Condimentos hechos con sal, echo sal de ajo, sal de apio, sal de cebolla, sal condimentada, suavizantes para rolando, y glutamato de monosodio (MSG, por kevin siglas en inglés)    Sopa Miso y mezclas para sopa enlatadas o secas    Salsa de soya regular, salsa de [address], [address] [facility], salsa para [facility], [address] [facility], y la mayoría de las vinagres con sabor    Alimentos para merendar, echo mario tostadas, palomitas de [place], pretzels, piel de cerdo, galletas de soda [facility], y nueces [employer], echo cenas, entradas, vegetales en salsa, y rolando [employer] sustitutos para la sal  Pregúntele a cross médico si es posible usar sustitutos de la sal  Algunos sustitutos de la sal vienen con ingredientes que pueden ser dañinos si usted tiene ciertos padecimientos médicos  Escoja los alimentos cuidadosamente cuando sale a comer a restaurantes: las comidas de los restaurantes, sobre todo restaurantes de comida rápida, rosemary siempre son altas en sodio  Algunos restaurantes ofrecen información nutricional que indica la cantidad de sodio en kevin alimentos   Pida que preparen kevin comidas con menos sal o sin sal     Lo que necesita saber acerca de las grasas: Las grasas insaturadas y los ácidos grasos omega-3 son ejemplos de grasas saludables  Las grasas no saludables incluyen las grasas saturadas y las grasas trans  Incluya grasas saludables, echo las siguientes:     Aceites de cocina, echo el de soja, canola, brito o girasol    Pescados grasos, echo el salmón, el atún, la caballa o las dany    Aceite de linaza o linaza molida    ½ taza de frijoles cocidos, echo frijoles negros, frijoles rojos o frijoles pintos    1½ onzas de kenny secos bajos en sodio, echo almendras o nueces    [place] de maní baja en azúcar y sodio    [place], echo las de chía o girasol       Limite o no consuma grasas poco saludables, echo los siguientes:     Alimentos que contienen grasa de origen animal, echo las rolando grasas, la [place], la [place] y la crema    Agia Thekla, margarina en consuelo, aceite de park y aceite de иван     Aderezo de ensalada completo o cremoso    Sopa cremosa    Galletas, mario fritas y productos de panadería elaborados con margarina o manteca    Alimentos fritos con grasas poco saludables    Salsa de carne y salsas, echo la [place] o la de queso    Lo que necesita saber acerca de los carbohidratos: Todos los carbohidratos se descomponen en azúcar  Los carbohidratos complejos contienen más fibra que los simples  [place] significa que los carbohidratos complejos entran en el torrente sanguíneo más lentamente y causan menos picos de azúcar en la blanche  Intenta incluir más carbohidratos complejos y menos carbohidratos simples    Incluya carbohidratos complejos, echo los siguientes:     1 tajada de pan integral    1 onza de cereal seco que no contenga azúcar añadida    ½ taza de alexsander cocida    2 onzas de pasta integral cocida    ½ taza de arroz integral cocido    Limite o no consuma carbohidratos simples, echo los siguientes:     Productos horneados, echo rosquillas, pasteles y galletas    Mezclas para pan de maíz y galletas    Arroz arita y mezclas de pasta, echo los [place] con queso de caja    Cereales instantáneos y fríos que contienen azúcar    Desean Breed y helado que contienen azúcar    Condimentos, echo el ketchup    Bebidas con alto contenido en azúcar, echo refrescos, limonadas y jugos de frutas    Lo que usted necesita saber sobre las verduras y las frutas: Las verduras y las frutas pueden ser frescas, congeladas o enlatadas  Si es posible, trate de elegir opciones enlatadas bajas en sodio  Incluya nathaniel variedad de verduras y frutas, echo las siguientes:     1 Corpus leroy, anjali o melocotón medianos (aproximadamente ½ taza picada)    ½ banana pequeña    ½ taza de bayas, echo arándanos, fresas o moras    1 taza de verduras de hoja ladi crudas, echo Vivian, espinacas, col rizada o berza    ½ taza de verduras congeladas o enlatadas (sin sal agregada), echo judías verdes    ½ taza de fruta fresca, congelada o enlatada (enlatada en sirope liviano o jugo de fruta)    ½ taza de jugo de verduras o frutas    Limite o no consuma verduras y frutas elaboradas de las siguientes maneras:     [place] congelada, echo las cerezas, con azúcar añadida    Frutas en crema o salsa de [place]    Las verduras enlatadas son altas en sodio  Sauerkraut, vegetales en escabeche, y otros alimentos preparados con vinagre    Vegetales fritos o vegetales en [place] o salsas altas en grasas    Lo que necesita saber acerca de los alimentos con proteína:   Incluya alimentos proteicos magros o bajos en grasa, echo los siguientes:     Joanne evangelist (maryellen, Hussein) sin piel    Pescado (sobre todo pescado con grasa, echo salmón, atún fresco o caballa)    [place] de res o de cerdo magra (fern, carne molida extra Abraham Ruth)    Claras de [place] y sustitutos del huevo    1 taza de leche descremada o leche 1%    1½ onzas de queso descremado o bajo en grasas    6 onzas de yogurt descremado o bajo en grasas    Limite o no consuma alimentos con alto contenido de proteínas, [facility] siguientes     [address] o [place], echo carne preparada con maíz, tocineta, jamón, perros calientes, y salchichas    Frijoles enlatados y joanne enlatadas o en pasta, echo joanne en conserva, dany, anchoas y [address] de [address] para emparedado, echo [place], [place], [place], y carne en rebanada    Joanne altas en grasas (biste estilo T-bone, carne molida para hamburguesas, costillas)    Huevos enteros y yemas de [place]    Leche [place], [place] al 2% y crema    Queso normal y queso fundido    Otras pautas que debe seguir:  Mantenga un peso saludable  Cross riesgo de enfermedad cardíaca es aún más alto si usted tiene sobrepeso  Cross médico podría sugerirle que adelgace si tiene sobrepeso  Usted puede perder peso si se propone consumir menos calorías y alimentos que tengan azúcar y grasas agregadas  El plan de alimentación DASH puede ayudarle a lograrlo  Jania buena forma de disminuir el consumo de calorías es consumiendo porciones más pequeñas en cada comida y menos meriendas entre comidas  Consulte a cross médico para obtener más información sobre cómo adelgazar  Realice actividad física con regularidad  El ejercicio regular puede ayudarle a alcanzar o mantener un peso saludable  El ejercicio regular también puede ayudarle a disminuir cross presión arterial y mejorar kevin niveles de colesterol  Duglas ejercicios moderados por 30 minutos o más todos los días de la [place]  Para bajar peso, asegúrese de ejercitarse por lo menos 60 minutos  Consulte con cross médico sobre un programa de ejercicio adecuado para usted  Limite el consumo de alcohol  Las mujeres deberían limitar el consumo de alcohol a 1 bebida por día  Los hombres deberían limitar el consumo de alcohol a 2 tragos al día  Un trago equivale a 12 onzas de cerveza, 5 onzas de vino o 1 onza y ½ de licor  Para más información:  [facility] [id number]  [place] [address]  Phone: [phone]  Web Address: ItCheaper no    © Copyright [employer] 2022 Information is for End User's use only and may not be sold, redistributed or otherwise used for commercial purposes  All illustrations and images included in CareNotes® are the copyrighted property of A CARMEN A Sajan STEPHENSON  or [address] [place] [address] es sólo para uso en educación  Cross intención no es darle un consejo médico sobre enfermedades o tratamientos  Colsulte con cross Lesleigh Kulwant farmacéutico antes de seguir cualquier régimen médico para saber si es seguro y efectivo para usted

## 2022-11-14 NOTE — ASSESSMENT & PLAN NOTE
More than one year duration  Will trial Claritin and Tessalon until patient returns in 2 weeks for more thorough evaluation  Will rule out GERD and red flag symptoms at follow up visit

## 2022-11-18 ENCOUNTER — OFFICE VISIT (OUTPATIENT)
Dept: FAMILY MEDICINE CLINIC | Facility: CLINIC | Age: 57
End: 2022-11-18

## 2022-11-18 VITALS
SYSTOLIC BLOOD PRESSURE: 146 MMHG | TEMPERATURE: 97 F | WEIGHT: 181 LBS | BODY MASS INDEX: 29.09 KG/M2 | DIASTOLIC BLOOD PRESSURE: 90 MMHG | RESPIRATION RATE: 16 BRPM | HEART RATE: 69 BPM | HEIGHT: 66 IN | OXYGEN SATURATION: 99 %

## 2022-11-18 DIAGNOSIS — R05.3 CHRONIC COUGH: Primary | ICD-10-CM

## 2022-11-18 DIAGNOSIS — R21 RASH OF MOUTH PRESENT ON EXAMINATION: ICD-10-CM

## 2022-11-18 RX ORDER — ALBUTEROL SULFATE 90 UG/1
2 AEROSOL, METERED RESPIRATORY (INHALATION) EVERY 6 HOURS PRN
Qty: 18 G | Refills: 5 | Status: SHIPPED | OUTPATIENT
Start: 2022-11-18

## 2022-11-18 RX ORDER — FLUTICASONE PROPIONATE 44 UG/1
2 AEROSOL, METERED RESPIRATORY (INHALATION) 2 TIMES DAILY
Qty: 10.6 G | Refills: 1 | Status: SHIPPED | OUTPATIENT
Start: 2022-11-18

## 2022-11-18 RX ORDER — FLUTICASONE PROPIONATE 50 MCG
1 SPRAY, SUSPENSION (ML) NASAL DAILY
Qty: 15.8 ML | Refills: 0 | Status: SHIPPED | OUTPATIENT
Start: 2022-11-18

## 2022-11-18 RX ORDER — PANTOPRAZOLE SODIUM 40 MG/1
40 TABLET, DELAYED RELEASE ORAL DAILY
Qty: 42 TABLET | Refills: 1 | Status: SHIPPED | OUTPATIENT
Start: 2022-11-18

## 2022-11-18 NOTE — PROGRESS NOTES
Name: Madan Wilson      : 1965      MRN: [id number]  Encounter Provider: Cathy Lozano MD  Encounter Date: 2022   Encounter department: [address] [place] [address]  Chronic cough  Assessment & Plan:  1 year duration  Differentials include GERD vs cough variant asthma vs asthma vs post-infectious bronchospasm (URI induced post nasal drip) vs medication side-effect vs seasonal allergies  Will treat for all suspected causes and eliminate medications appropriately  Will DC Lisinopril as it may be exacerbating her symptoms  Patient to RTC in 2 weeks for initiation of Valsartan  Orders:  -     fluticasone (FLONASE) 50 mcg/act nasal spray; 1 spray into each nostril daily  -     fluticasone (Flovent HFA) 44 mcg/act inhaler; Inhale 2 puffs 2 (two) times a day Rinse mouth after use  -     albuterol (Ventolin HFA) 90 mcg/act inhaler; Inhale 2 puffs every 6 (six) hours as needed for wheezing  -     pantoprazole (PROTONIX) 40 mg tablet; Take 1 tablet (40 mg total) by mouth daily Take 30 minutes before you eat or drink in the morning    2  Rash of mouth present on examination  Assessment & Plan:  Likely 2/2 food induced irritation subsequent to eating pineaple (patient reports experiencing similar rash whenever she eats pineapple)  Cannot rule out angioedema 2/2 lisinopril initiation 1 month prior  Please see plan for chronic cough  Orders:  -     diphenhydrAMINE (BENADRYL) 2 % cream; Apply topically 3 (three) times a day as needed for itching         Subjective     Chronic cough  Cough: Patient complains of nonproductive cough  Symptoms began 1 year ago  The cough is non-productive, without wheezing, dyspnea or hemoptysis, nocturnal, waxing and waning over time and is aggravated by cold air and reclining position Associated symptoms include:heartburn  Patient does not have new pets  She has a 3 yo dog  Patient does not have a history of asthma  Patient does not have a history of environmental allergens  Patient does not have a history of smoking  Patient  does not have previous Chest X-ray  Patient has not had a PPD done  Review of Systems   Constitutional: Negative for chills and fever  HENT: Negative for congestion, ear pain, rhinorrhea and sinus pain  Eyes: Negative for visual disturbance  Respiratory: Positive for cough (chronic, as per hpi)  Negative for chest tightness, shortness of breath and wheezing  Cardiovascular: Negative for chest pain and palpitations  Gastrointestinal: Negative for abdominal pain, constipation, diarrhea and vomiting  Endocrine: Negative for polyuria  Genitourinary: Negative for dysuria  Musculoskeletal: Negative for arthralgias and myalgias  Neurological: Negative for dizziness, syncope and light-headedness  Psychiatric/Behavioral: Negative for hallucinations, self-injury and suicidal ideas  Past Medical History:   Diagnosis Date   • Hypertension      No past surgical history on file  No family history on file  Social History     Socioeconomic History   • Marital status: Single     Spouse name: None   • Number of children: None   • Years of education: None   • Highest education level: None   Occupational History   • None   Tobacco Use   • Smoking status: Never   • Smokeless tobacco: Never   Substance and Sexual Activity   • Alcohol use: Never   • Drug use: Never   • Sexual activity: None   Other Topics Concern   • None   Social History Narrative   • None     Social Determinants of Health     Financial Resource Strain: Low Risk    • Difficulty of Paying Living Expenses: Not hard at all   Food Insecurity: No Food Insecurity   • Worried About Running Out of Food in the Last Year: Never true   • Ran Out of Food in the Last Year: Never true   Transportation Needs: No Transportation Needs   • Lack of Transportation (Medical): No   • Lack of Transportation (Non-Medical):  No   Physical Activity: Not on file   Stress: Not on file   Social Connections: Not on file   Intimate Partner Violence: Not on file   Housing Stability: Not on file     Current Outpatient Medications on File Prior to Visit   Medication Sig   • benzonatate (TESSALON) 200 MG capsule Take 1 capsule (200 mg total) by mouth 3 (three) times a day as needed for cough   • guaiFENesin (ROBITUSSIN) 100 MG/5ML oral liquid Take 10 mL (200 mg total) by mouth 3 (three) times a day as needed for cough   • loratadine (CLARITIN) 10 mg tablet Take 1 tablet (10 mg total) by mouth daily   • metFORMIN (GLUCOPHAGE) 500 mg tablet Take 0 5 tablets (250 mg total) by mouth daily with breakfast for 7 days, THEN 1 tablet (500 mg total) daily with breakfast for 7 days, THEN 1 tablet (500 mg total) 2 (two) times a day with meals  • methocarbamol (ROBAXIN) 500 mg tablet Take 1 tablet (500 mg total) by mouth 2 (two) times a day as needed for muscle spasms   • methylPREDNISolone 4 MG tablet therapy pack Use as directed on package   • [DISCONTINUED] lisinopril (ZESTRIL) 10 mg tablet Take 1 tablet (10 mg total) by mouth daily     Allergies   Allergen Reactions   • Pineapple - Food Allergy Lip Swelling     Immunization History   Administered Date(s) Administered   • COVID-19 PFIZER VACCINE 0 3 ML IM 11/12/2021, 12/03/2021       Objective     /90 (BP Location: Left arm, Patient Position: Sitting, Cuff Size: Standard)   Pulse 69   Temp (!) 97 °F (36 1 °C) (Temporal)   Resp 16   Ht 5' 6" (1 676 m)   Wt 82 1 kg (181 lb)   SpO2 99%   BMI 29 21 kg/m²     Physical Exam  Constitutional:       Appearance: Normal appearance  HENT:      Head: Normocephalic and atraumatic  Nose: Nose normal       Mouth/Throat:      Comments: Maculopapular rash and edema of oral and perioral region - please see image below  Eyes:      Conjunctiva/sclera: Conjunctivae normal    Cardiovascular:      Rate and Rhythm: Normal rate     Pulmonary:      Effort: Pulmonary effort is normal  Musculoskeletal:         General: Normal range of motion  Cervical back: Normal range of motion  Skin:     General: Skin is warm and dry  Neurological:      Mental Status: She is alert and oriented to person, place, and time     Psychiatric:         Behavior: Behavior normal              Markell Murphy MD

## 2022-11-18 NOTE — ASSESSMENT & PLAN NOTE
1 year duration  Differentials include GERD vs cough variant asthma vs asthma vs post-infectious bronchospasm (URI induced post nasal drip) vs medication side-effect vs seasonal allergies  Will treat for all suspected causes and eliminate medications appropriately  Will DC Lisinopril as it may be exacerbating her symptoms  Patient to RTC in 2 weeks for initiation of Valsartan

## 2022-11-18 NOTE — ASSESSMENT & PLAN NOTE
Likely 2/2 food induced irritation subsequent to eating pineaple (patient reports experiencing similar rash whenever she eats pineapple)  Cannot rule out angioedema 2/2 lisinopril initiation 1 month prior  Please see plan for chronic cough

## 2022-11-30 ENCOUNTER — HOSPITAL ENCOUNTER (OUTPATIENT)
Dept: MAMMOGRAPHY | Facility: CLINIC | Age: 57
Discharge: HOME | End: 2022-11-30

## 2022-11-30 DIAGNOSIS — Z12.31 ENCOUNTER FOR SCREENING MAMMOGRAM FOR MALIGNANT NEOPLASM OF BREAST: ICD-10-CM

## 2022-12-12 PROBLEM — Z11.3 SCREENING EXAMINATION FOR STD (SEXUALLY TRANSMITTED DISEASE): Status: RESOLVED | Noted: 2022-10-13 | Resolved: 2022-12-12

## 2022-12-17 DIAGNOSIS — R05.3 CHRONIC COUGH: ICD-10-CM

## 2022-12-17 RX ORDER — FLUTICASONE PROPIONATE 50 MCG
SPRAY, SUSPENSION (ML) NASAL
Qty: 16 G | Refills: 1 | Status: SHIPPED | OUTPATIENT
Start: 2022-12-17

## 2023-03-09 ENCOUNTER — OFFICE VISIT (OUTPATIENT)
Dept: FAMILY MEDICINE CLINIC | Facility: CLINIC | Age: 58
End: 2023-03-09

## 2023-03-09 ENCOUNTER — APPOINTMENT (OUTPATIENT)
Dept: LAB | Facility: CLINIC | Age: 58
End: 2023-03-09

## 2023-03-09 VITALS
HEIGHT: 66 IN | OXYGEN SATURATION: 99 % | BODY MASS INDEX: 29.57 KG/M2 | SYSTOLIC BLOOD PRESSURE: 130 MMHG | HEART RATE: 81 BPM | DIASTOLIC BLOOD PRESSURE: 82 MMHG | WEIGHT: 184 LBS | RESPIRATION RATE: 16 BRPM | TEMPERATURE: 97.2 F

## 2023-03-09 DIAGNOSIS — Z12.31 SCREENING MAMMOGRAM FOR BREAST CANCER: ICD-10-CM

## 2023-03-09 DIAGNOSIS — R73.03 PREDIABETES: Primary | ICD-10-CM

## 2023-03-09 DIAGNOSIS — I10 PRIMARY HYPERTENSION: ICD-10-CM

## 2023-03-09 DIAGNOSIS — Z13.21 ENCOUNTER FOR VITAMIN DEFICIENCY SCREENING: ICD-10-CM

## 2023-03-09 LAB
25(OH)D3 SERPL-MCNC: 17.9 NG/ML (ref 30–100)
CREAT UR-MCNC: 130 MG/DL
MICROALBUMIN UR-MCNC: 79.7 MG/L (ref 0–20)
MICROALBUMIN/CREAT 24H UR: 61 MG/G CREATININE (ref 0–30)
SL AMB POCT HEMOGLOBIN AIC: 5.6 (ref ?–6.5)

## 2023-03-09 NOTE — PATIENT INSTRUCTIONS
[facility] Women's Multi Vitamin   Take a daily probiotic     Plan de alimentación con "enfoque dietético para detener la hipertensión” (DASH, por kevin siglas en inglés)   LO QUE NECESITA SABER:   El plan de alimentación DASH está diseñado para ayudar a prevenir o disminuir la hipertensión  También puede ayudar a bajar el colesterol guero (colesterol LDL) y disminuir cross riesgo de enfermedad cardíaca  El plan es bajo en sodio, azúcar, grasas dañinas, y grasas en cross totalidad  Es alto en potasio, calcio, magnesio y [place]  Estos nutrientes se agregan al consumir más frutas, vegetales y granos enteros  Con el plan de alimentación DASH, usted necesita consumir un número específico de porciones de cada henry de alimentos  [place] le ayudará a consumir las cantidades suficientes de ciertos nutrientes y limitar otros  La cantidad de porciones que usted debe comer depende de la cantidad de calorías que usted necesita  Cross dietista puede ayudarlo a crear planes de comidas con la cantidad [place] de porciones para cada henry de alimentos  INSTRUCCIONES SOBRE EL [facility] HOSPITALARIA:   Lo que necesita saber acerca del sodio: Cross dietista le indicará la cantidad de sodio que usted debe consumir a diario  La gente que tiene la presión arterial jameson debe consumir de 1,500 a 2,300 mg de sodio al día echo eran  Nathaniel cucharadita (cdta) de sal tiene 2,300 mg de sodio  [place] puede parecer echo nathaniel meta difícil, akash pequeños cambios en los alimentos que usted consume pueden hacer nathaniel gran diferencia  Cross médico o dietista puede ayudarlo a crear un plan alimenticio que cumpla cross límite de sodio  Celina las etiquetas de los alimentos  Las etiquetas pueden ayudarle a escoger alimentos bajos en sodio  La cantidad de sodio está incluida en miligramos (mg)  La columna del porcentaje de valor diario indica la cantidad de necesidades diarias satisfechas con 1 porción del alimento para cada nutriente en la lista   Escoja alimentos que tengan menos de 5% del porcentaje diario de sodio  Estos alimentos se consideran bajos en sodio  Los alimentos que tienen 20% o más del porcentaje diario de sodio se consideran alimentos altos en sodio  Evite alimentos que tengan más de 300 mg de sodio por porción  Escoja alimentos Joanell Rye diga que son bajos en sodio, con sodio reducido, o sin sal agregada  Limite la sal agregada  No sale la comida en la adan si se añade sal al cocinar  Use hierbas y condimentos, echo cebollas, ajo y especias sin sal para agregar sabor  Use jugo de lima, carole o vinagre para agregar un sabor ácido  Use chiles picantes o nathaniel cantidad pequeña de salsa picante para agregar un sabor picante  Limite los alimentos con alto contenido de sal agregada, echo los siguientes:    Condimentos hechos con sal, echo sal de ajo, sal de apio, sal de cebolla, sal condimentada, suavizantes para rolando, y glutamato de monosodio (MSG, por kevin siglas en inglés)    Sopa Miso y mezclas para sopa enlatadas o secas    Salsa de soya regular, salsa de [address], [address], salsa para biste, [facility], y la mayoría de las vinagres con sabor    Alimentos para merendar, echo mario tostadas, palomitas de [place], pretzels, piel de cerdo, galletas de soda [facility], y nueces [employer], echo cenas, entradas, vegetales en salsa, y rolando [employer] sustitutos para la sal  Pregúntele a cross médico si es posible usar sustitutos de la sal  Algunos sustitutos de la sal vienen con ingredientes que pueden ser dañinos si usted tiene ciertos padecimientos médicos  Escoja los alimentos cuidadosamente cuando sale a comer a restaurantes: las comidas de los restaurantes, sobre todo restaurantes de comida rápida, rosemary siempre son altas en sodio  Algunos restaurantes ofrecen información nutricional que indica la cantidad de sodio en kevin alimentos   Pida que preparen kevin comidas con menos sal o sin sal     Lo que necesita saber acerca de las grasas: Las grasas insaturadas y los ácidos grasos omega-3 son ejemplos de grasas saludables  Las grasas no saludables incluyen las grasas saturadas y las grasas trans  Incluya grasas saludables, echo las siguientes:     Aceites de cocina, echo el de soja, canola, brito o girasol    Pescados grasos, echo el salmón, el atún, la caballa o las dany    Aceite de linaza o linaza molida    ½ taza de frijoles cocidos, echo frijoles negros, frijoles rojos o frijoles pintos    1½ onzas de kenny secos bajos en sodio, echo almendras o nueces    [place] de maní baja en azúcar y sodio    [place], echo las de chía o girasol       Limite o no consuma grasas poco saludables, echo los siguientes:     Alimentos que contienen grasa de origen animal, echo las rolando grasas, la [place], la [place] y la crema    Agia Thekla, margarina en consuelo, aceite de park y aceite de иван     Aderezo de ensalada completo o cremoso    Sopa cremosa    Galletas, mario fritas y productos de panadería elaborados con margarina o [place]    Alimentos fritos con grasas poco saludables    Salsa de carne y salsas, echo la [place] o la de queso    Lo que necesita saber acerca de los carbohidratos: Todos los carbohidratos se descomponen en azúcar  Los carbohidratos complejos contienen más fibra que los simples  [place] significa que los carbohidratos complejos entran en el torrente sanguíneo más lentamente y causan menos picos de azúcar en la blanche  Intenta incluir más carbohidratos complejos y menos carbohidratos simples    Incluya carbohidratos complejos, echo los siguientes:     1 tajada de pan integral    1 onza de cereal seco que no contenga azúcar añadida    ½ taza de alexsander cocida    2 onzas de pasta integral cocida    ½ taza de arroz integral cocido    Limite o no consuma carbohidratos simples, echo los siguientes:     Productos horneados, echo rosquillas, pasteles y galletas    Mezclas para pan de maíz y galletas    Arroz arita y mezclas de pasta, echo los [place] con queso de caja    Cereales instantáneos y fríos que contienen azúcar    Yasmeen Hand y helado que contienen azúcar    Condimentos, echo el ketchup    Bebidas con alto contenido en azúcar, echo refrescos, limonadas y jugos de frutas    Lo que usted necesita saber sobre las verduras y las frutas: Las verduras y las frutas pueden ser frescas, congeladas o enlatadas  Si es posible, trate de elegir opciones enlatadas bajas en sodio  Incluya nathaniel variedad de verduras y frutas, echo las siguientes:     1 Corpus leroy, anjali o melocotón medianos (aproximadamente ½ taza picada)    ½ banana pequeña    ½ taza de bayas, echo arándanos, fresas o moras    1 taza de verduras de hoja ladi crudas, echo [place], espinacas, col rizada o berza    ½ taza de verduras congeladas o enlatadas (sin sal agregada), echo judías verdes    ½ taza de fruta fresca, congelada o enlatada (enlatada en sirope liviano o jugo de fruta)    ½ taza de jugo de verduras o frutas    Limite o no consuma verduras y frutas elaboradas de las siguientes maneras:     [place] congelada, echo las cerezas, con azúcar añadida    Frutas en [facility] o salsa de [place]    Las verduras enlatadas son altas en sodio  Sauerkraut, vegetales en escabeche, y otros alimentos preparados con vinagre    Vegetales fritos o vegetales en [place] o salsas altas en grasas    Lo que necesita saber acerca de los alimentos con proteína:   Incluya alimentos proteicos magros o bajos en grasa, echo los siguientes:     Joanne evangelist (Hussein bojorquez) sin piel    Pescado (sobre todo pescado con grasa, echo salmón, atún fresco o caballa)    [place] de res o de cerdo magra (lomo, carne molida extra [place])    Claras de [place] y sustitutos del huevo    1 taza de leche descremada o leche 1%    1½ onzas de queso descremado o bajo en grasas    6 onzas de yogurt descremado o bajo en grasas    Limite o no consuma alimentos con alto contenido de proteínas, echo los siguientes     [address] o curadas, echo carne preparada con [place], [place], jamón, perros calientes, y salchichas    Frijoles enlatados y joanne enlatadas o en pasta, echo joanne en [place], [facility], L[facility] hospitals in the [place] y [address] de [address] para emparedado, echo [place], [place], [place], y carne en rebanada    Joanne altas en grasas (biste estilo T-bone, carne molida para hamburguesas, costillas)    [employer] enteros y yemas de [place]    Leche [place], [place] al 2% y crema    Queso normal y queso fundido    Otras pautas que debe seguir:  Mantenga un peso saludable  Cross riesgo de enfermedad cardíaca es aún más alto si usted tiene sobrepeso  Cross médico podría sugerirle que adelgace si tiene sobrepeso  Usted puede perder peso si se propone consumir menos calorías y alimentos que tengan azúcar y grasas agregadas  El plan de alimentación DASH puede ayudarle a lograrlo  Jania buena forma de disminuir el consumo de calorías es consumiendo porciones más pequeñas en cada comida y menos meriendas entre comidas  Consulte a cross médico para obtener más información sobre cómo adelgazar  Realice actividad física con regularidad  El ejercicio regular puede ayudarle a alcanzar o mantener un peso saludable  El ejercicio regular también puede ayudarle a disminuir cross presión arterial y mejorar kevin niveles de colesterol  Duglas ejercicios moderados por 30 minutos o más todos los días de la [place]  Para bajar peso, asegúrese de ejercitarse por lo menos 60 minutos  Consulte con cross médico sobre un programa de ejercicio adecuado para usted  Limite el consumo de alcohol  Las mujeres deberían limitar el consumo de alcohol a 1 bebida por día  Los hombres deberían limitar el consumo de alcohol a 2 tragos al día  Un trago equivale a 12 onzas de cerveza, 5 onzas de vino o 1 onza y ½ de licor  Para más información:  [facility] and [address]  [address] [address]  [place] [address]  Phone: [phone]  Web Address: [facility] no    © [facility] Merative 2022 Information is for End User's use only and may not be sold, redistributed or otherwise used for commercial purposes  Esta información es sólo para uso en educación  Cross intención no es darle un consejo médico sobre enfermedades o tratamientos  Colsulte con cross Elvia Rekha farmacéutico antes de seguir cualquier régimen médico para saber si es seguro y efectivo para usted

## 2023-03-09 NOTE — PROGRESS NOTES
Name: Reena Duran      : 1965      MRN: [id number]  Encounter Provider: Tony Hunt MD  Encounter Date: 3/9/2023   Encounter department: [address] [place] [address]     1  Prediabetes  Assessment & Plan:  HA1C (10/3/22) 5 9, in prediabetic range  Managed on Metformin 500 mg BID  POCT HA1c today: 5 6, now in normal range  Patient reports no side effects on Metformin however would like to be on as few medications as possible  Patient reports she has been eating more sugary foods than normal and is willing to work on improving her diet  Last microalbumin/creatinine ratio was on 10/3/22 and elevated at 107  -Will dc Metformin at this time  Have discussed keeping a food diary for better blood sugar control   -Repeat Test for microalbuminuria and initiate Lisinopril if needed  -Follow up in 4 months for repeat A1C  -Additional information, including diet and exercise recommendations, provided in AVS     Orders:  -     POCT hemoglobin A1c  -     Microalbumin / creatinine urine ratio    2  Primary hypertension  Assessment & Plan:  Well controlled on lifestyle modifications alone  Patient initiated on Lisinopril in 2022 which was dc'd after soft BP readings  Discussed exercise and diet modifications at length  Orders:  -     Microalbumin / creatinine urine ratio    3  Encounter for vitamin deficiency screening  -     Vitamin D 25 hydroxy; Future    4  Screening mammogram for breast cancer  -     Mammo screening bilateral w 3d & cad; Future; Expected date: 2023         Subjective     This is a very pleasant 65 y o  female who presents to the clinic for management of their chronic medical conditions  Patient's medical conditions are stable unless noted otherwise above  Patient has not had any recent hospitalizations, or medical emergencies since last visit    Patient has no further complaints other than what is mentioned in the ROS     Review of Systems   Constitutional: Negative for chills and fever  HENT: Negative for congestion, ear pain, rhinorrhea and sinus pain  Eyes: Negative for visual disturbance  Respiratory: Negative for chest tightness, shortness of breath and wheezing  Cardiovascular: Negative for chest pain and palpitations  Gastrointestinal: Negative for abdominal pain, constipation, diarrhea and vomiting  Endocrine: Negative for polyuria  Genitourinary: Negative for dysuria  Musculoskeletal: Negative for arthralgias and myalgias  Neurological: Negative for dizziness, syncope and light-headedness  Psychiatric/Behavioral: Negative for hallucinations, self-injury and suicidal ideas  Past Medical History:   Diagnosis Date   • Hypertension      No past surgical history on file    Family History   Problem Relation Age of Onset   • No Known Problems Mother    • No Known Problems Maternal Grandmother    • No Known Problems Maternal Grandfather    • No Known Problems Paternal Grandmother    • No Known Problems Paternal Grandfather    • No Known Problems Maternal Aunt    • No Known Problems Maternal Aunt    • No Known Problems Maternal Aunt    • No Known Problems Maternal Aunt    • No Known Problems Maternal Aunt    • No Known Problems Paternal Aunt    • No Known Problems Paternal Aunt      Social History     Socioeconomic History   • Marital status: Single     Spouse name: None   • Number of children: None   • Years of education: None   • Highest education level: None   Occupational History   • None   Tobacco Use   • Smoking status: Never   • Smokeless tobacco: Never   Substance and Sexual Activity   • Alcohol use: Never   • Drug use: Never   • Sexual activity: None   Other Topics Concern   • None   Social History Narrative   • None     Social Determinants of Health     Financial Resource Strain: Low Risk    • Difficulty of Paying Living Expenses: Not hard at all   Food Insecurity: No Food Insecurity   • Worried About [facility] in the Last Year: Never true   • Ran Out of Food in the Last Year: Never true   Transportation Needs: No Transportation Needs   • Lack of Transportation (Medical): No   • Lack of Transportation (Non-Medical): No   Physical Activity: Not on file   Stress: Not on file   Social Connections: Not on file   Intimate Partner Violence: Not on file   Housing Stability: Not on file     Current Outpatient Medications on File Prior to Visit   Medication Sig   • albuterol (Ventolin HFA) 90 mcg/act inhaler Inhale 2 puffs every 6 (six) hours as needed for wheezing   • benzonatate (TESSALON) 200 MG capsule Take 1 capsule (200 mg total) by mouth 3 (three) times a day as needed for cough   • diphenhydrAMINE (BENADRYL) 2 % cream Apply topically 3 (three) times a day as needed for itching   • fluticasone (FLONASE) 50 mcg/act nasal spray SHAKE LIQUID AND USE 1 SPRAY IN EACH NOSTRIL DAILY   • fluticasone (Flovent HFA) 44 mcg/act inhaler Inhale 2 puffs 2 (two) times a day Rinse mouth after use  • guaiFENesin (ROBITUSSIN) 100 MG/5ML oral liquid Take 10 mL (200 mg total) by mouth 3 (three) times a day as needed for cough   • loratadine (CLARITIN) 10 mg tablet Take 1 tablet (10 mg total) by mouth daily   • metFORMIN (GLUCOPHAGE) 500 mg tablet Take 0 5 tablets (250 mg total) by mouth daily with breakfast for 7 days, THEN 1 tablet (500 mg total) daily with breakfast for 7 days, THEN 1 tablet (500 mg total) 2 (two) times a day with meals     • methocarbamol (ROBAXIN) 500 mg tablet Take 1 tablet (500 mg total) by mouth 2 (two) times a day as needed for muscle spasms   • methylPREDNISolone 4 MG tablet therapy pack Use as directed on package   • pantoprazole (PROTONIX) 40 mg tablet Take 1 tablet (40 mg total) by mouth daily Take 30 minutes before you eat or drink in the morning     Allergies   Allergen Reactions   • Pineapple - Food Allergy Lip Swelling     Immunization History   Administered Date(s) Administered • COVID-19 PFIZER VACCINE 0 3 ML IM 11/12/2021, 12/03/2021       Objective     /82 (BP Location: Left arm, Patient Position: Sitting, Cuff Size: Standard)   Pulse 81   Temp (!) 97 2 °F (36 2 °C) (Temporal)   Resp 16   Ht 5' 6" (1 676 m)   Wt 83 5 kg (184 lb)   SpO2 99%   BMI 29 70 kg/m²     Physical Exam  Constitutional:       Appearance: Normal appearance  HENT:      Head: Normocephalic and atraumatic  Nose: Nose normal    Eyes:      Conjunctiva/sclera: Conjunctivae normal    Cardiovascular:      Rate and Rhythm: Normal rate  Pulmonary:      Effort: Pulmonary effort is normal    Musculoskeletal:         General: Normal range of motion  Cervical back: Normal range of motion  Skin:     General: Skin is warm and dry  Neurological:      Mental Status: She is alert and oriented to person, place, and time     Psychiatric:         Behavior: Behavior normal        Monet Calvin MD

## 2023-03-09 NOTE — ASSESSMENT & PLAN NOTE
HA1C (10/3/22) 5 9, in prediabetic range  Managed on Metformin 500 mg BID  POCT HA1c today: 5 6, now in normal range  Patient reports no side effects on Metformin however would like to be on as few medications as possible  Patient reports she has been eating more sugary foods than normal and is willing to work on improving her diet  Last microalbumin/creatinine ratio was on 10/3/22 and elevated at 107  -Will dc Metformin at this time   Have discussed keeping a food diary for better blood sugar control   -Repeat Test for microalbuminuria and initiate Lisinopril if needed  -Follow up in 4 months for repeat A1C  -Additional information, including diet and exercise recommendations, provided in AVS

## 2023-03-13 DIAGNOSIS — E55.9 VITAMIN D DEFICIENCY: Primary | ICD-10-CM

## 2023-03-13 RX ORDER — ERGOCALCIFEROL 1.25 MG/1
50000 CAPSULE ORAL WEEKLY
Qty: 12 CAPSULE | Refills: 0 | Status: SHIPPED | OUTPATIENT
Start: 2023-03-13 | End: 2023-05-30

## 2023-03-13 RX ORDER — MELATONIN
2000 DAILY
Qty: 60 TABLET | Refills: 2 | Status: SHIPPED | OUTPATIENT
Start: 2023-06-05 | End: 2023-09-03

## 2023-03-20 ENCOUNTER — TELEPHONE (OUTPATIENT)
Dept: GASTROENTEROLOGY | Facility: CLINIC | Age: 58
End: 2023-03-20

## 2023-03-20 DIAGNOSIS — Z12.11 SCREENING FOR COLON CANCER: Primary | ICD-10-CM

## 2023-03-20 NOTE — TELEPHONE ENCOUNTER
Scheduled date of 03/20/2023  Physician performing: Dr Susi Hirsch  Location of procedure:  [address] [place] [address]  Instructions given to patient:  Betty  Clearances: N/A     OA/ Pre-Diabetic     10/04/22  Screened by: Denia Ibrahim     Referring Provider      Pre- Screening:      Body mass index is 30 02 kg/m²  Has patient been referred for a routine screening Colonoscopy? yes  Is the patient between 39-70 years old? yes        Previous Colonoscopy no   If yes:    Date:     Facility:     Reason:         SCHEDULING STAFF: If the patient is between 45yrs-49yrs, please advise patient to confirm benefits/coverage with their insurance company for a routine screening colonoscopy, some insurance carriers will only cover at [address] or older  If the patient is over 66years old, please schedule an office visit       Does the patient want to see a Gastroenterologist prior to their procedure OR are they having any GI symptoms? no     Has the patient been hospitalized or had abdominal surgery in the past 6 months? no     Does the patient use supplemental oxygen? no     Does the patient take Coumadin, Lovenox, Plavix, Elliquis, Xarelto, or other blood thinning medication? no     Has the patient had a stroke, cardiac event, or stent placed in the past year? no     SCHEDULING STAFF: If patient answers NO to above questions, then schedule procedure  If patient answers YES to above questions, then schedule office appointment       If patient is between 45yrs - 49yrs, please advise patient that we will have to confirm benefits & coverage with their insurance company for a routine screening colonoscopy

## 2023-04-03 ENCOUNTER — OFFICE VISIT (OUTPATIENT)
Dept: FAMILY MEDICINE CLINIC | Facility: CLINIC | Age: 58
End: 2023-04-03

## 2023-04-03 VITALS
WEIGHT: 183 LBS | SYSTOLIC BLOOD PRESSURE: 170 MMHG | DIASTOLIC BLOOD PRESSURE: 100 MMHG | TEMPERATURE: 96.7 F | HEART RATE: 93 BPM | OXYGEN SATURATION: 97 % | BODY MASS INDEX: 29.41 KG/M2 | RESPIRATION RATE: 16 BRPM | HEIGHT: 66 IN

## 2023-04-03 DIAGNOSIS — I10 PRIMARY HYPERTENSION: Primary | ICD-10-CM

## 2023-04-03 DIAGNOSIS — E55.9 VITAMIN D DEFICIENCY: ICD-10-CM

## 2023-04-03 RX ORDER — LISINOPRIL 20 MG/1
20 TABLET ORAL DAILY
Qty: 90 TABLET | Refills: 3 | Status: SHIPPED | OUTPATIENT
Start: 2023-04-03

## 2023-04-03 NOTE — ASSESSMENT & PLAN NOTE
As evidenced by Vit D level of 17 9  Discussed importance of Vit D in order to absorb calcium  People who do not have enough Vit D can develop weak or soft bones, which can cause bones to break easily or change in shape  It can also cause weakness in muscles, which can lead to weakness, fatigue, and falls  Discussed increasing consumption of fruits (eg oranges), salmon, tuna, and cod liver oil  Also discussed sunlight as source of Vit D however this is not a recommended source of intake since this can predispose skin to sun damage  Supplementation with Vit D is the recommended treatment for Vit D deficiency  I have sent a prescription to their pharmacy  Patient is to take 50 000 units weekly for 12 weeks followed by 2000 units daily for 3 months, at which time we will recheck levels

## 2023-04-03 NOTE — PATIENT INSTRUCTIONS
"Plan de alimentación con \"enfoque dietético para detener la hipertensión” (DASH, por kevin siglas en inglés)   LO QUE NECESITA SABER:   El plan de alimentación DASH está diseñado para ayudar a prevenir o disminuir la hipertensión  También puede ayudar a bajar el colesterol guero (colesterol LDL) y disminuir cross riesgo de enfermedad cardíaca  El plan es bajo en sodio, azúcar, grasas dañinas, y grasas en cross totalidad  Es alto en potasio, calcio, magnesio y [place]  Estos nutrientes se agregan al consumir más frutas, vegetales y granos enteros  Con el plan de alimentación DASH, usted necesita consumir un número específico de porciones de cada henry de alimentos  [place] le ayudará a consumir las cantidades suficientes de ciertos nutrientes y limitar otros  La cantidad de porciones que usted debe comer depende de la cantidad de calorías que usted necesita  Cross dietista puede ayudarlo a crear planes de comidas con la cantidad [place] de porciones para cada henry de alimentos  INSTRUCCIONES SOBRE EL [facility]ITAL[facility]IA:   Lo que necesita saber acerca del sodio: Cross dietista le indicará la cantidad de sodio que usted debe consumir a diario  La gente que tiene la presión arterial jameson debe consumir de 1,500 a 2,300 mg de sodio al día echo eran  Nathaniel cucharadita (cdta) de sal tiene 2,300 mg de sodio  [place] puede parecer echo nathaniel meta difícil, akash pequeños cambios en los alimentos que usted consume pueden hacer nathaniel gran diferencia  Cross médico o dietista puede ayudarlo a crear un plan alimenticio que cumpla cross límite de sodio  Celina las etiquetas de los alimentos  Las etiquetas pueden ayudarle a escoger alimentos bajos en sodio  La cantidad de sodio está incluida en miligramos (mg)  La columna del porcentaje de valor diario indica la cantidad de necesidades diarias satisfechas con 1 porción del alimento para cada nutriente en la lista  Escoja alimentos que tengan menos de 5% del porcentaje diario de [place]   Estos alimentos se " consideran bajos en sodio  Los alimentos que tienen 20% o más del porcentaje diario de sodio se consideran alimentos altos en sodio  Evite alimentos que tengan más de 300 mg de sodio por porción  Escoja alimentos [place] diga que son bajos en sodio, con sodio reducido, o sin sal agregada  Limite la sal agregada  No sale la comida en la adan si se añade sal al cocinar  Use hierbas y condimentos, echo cebollas, ajo y especias sin sal para agregar sabor  Use jugo de lima, carole o vinagre para agregar un sabor ácido  Use chiles picantes o nathaniel cantidad pequeña de salsa picante para agregar un sabor picante  Limite los alimentos con alto contenido de sal agregada, echo los siguientes:    Condimentos hechos con sal, echo sal de ajo, sal de apio, sal de cebolla, sal condimentada, suavizantes para rolando, y glutamato de monosodio (MSG, por kevin siglas en inglés)    Sopa Miso y mezclas para sopa enlatadas o secas    Salsa de soya regular, salsa de [address] [facility], salsa para \[facility]\""te, [address] [facility], y la mayoría de las vinagres con sabor    Alimentos para merendar, echo mario tostadas, palomitas de [place], pretzels, piel de cerdo, galletas de soda [facility], y nueces [employer], echo cenas, entradas, vegetales en salsa, y rolando [employer] sustitutos para la sal  Pregúntele a cross médico si es posible usar sustitutos de la sal  Algunos sustitutos de la sal vienen con ingredientes que pueden ser dañinos si usted tiene ciertos padecimientos médicos  Escoja los alimentos cuidadosamente cuando sale a comer a restaurantes: las comidas de los restaurantes, sobre todo restaurantes de comida rápida, rosemary siempre son altas en sodio  Algunos restaurantes ofrecen información nutricional que indica la cantidad de sodio en kevin alimentos   Pida que preparen kevin comidas con menos sal o sin sal     Lo que necesita saber acerca de las grasas: Las grasas insaturadas y los ácidos grasos omega-3 son ejemplos de grasas saludables  Las grasas no saludables incluyen las grasas saturadas y las grasas trans  Incluya grasas saludables, echo las siguientes:     Aceites de cocina, echo el de soja, canola, brito o girasol    Pescados grasos, echo el salmón, el atún, la caballa o las dany    Aceite de linaza o linaza molida    ½ taza de frijoles cocidos, echo frijoles negros, frijoles rojos o frijoles pintos    1½ onzas de kenny secos bajos en sodio, echo almendras o nueces    [place] de maní baja en azúcar y sodio    [place], echo las de chía o girasol       Limite o no consuma grasas poco saludables, echo los siguientes:     Alimentos que contienen grasa de origen animal, echo las rolando grasas, la [place], la [place] y la crema    Agia [place]kla, margarina en consuelo, aceite de park y aceite de иван     Aderezo de ensalada completo o cremoso    Sopa cremosa    Galletas, mario fritas y productos de panadería elaborados con margarina o manteca    Alimentos fritos con grasas poco saludables    Salsa de carne y salsas, echo la [place] o la de queso    Lo que necesita saber acerca de los carbohidratos: Todos los carbohidratos se descomponen en azúcar  Los carbohidratos complejos contienen más fibra que los simples  [place] significa que los carbohidratos complejos entran en el torrente sanguíneo más lentamente y causan menos picos de azúcar en la blanche  [place]enta incluir más carbohidratos complejos y menos carbohidratos simples    Incluya carbohidratos complejos, echo los siguientes:     1 tajada de pan integral    1 onza de cereal seco que no contenga azúcar añadida    ½ taza de alexsander cocida    2 onzas de pasta integral cocida    ½ taza de arroz integral cocido    Limite o no consuma carbohidratos simples, echo los siguientes:     Productos horneados, echo rosquillas, pasteles y galletas    Mezclas para pan de maíz y galletas    Arroz arita y mezclas de pasta, echo los [place] con queso de caja    Cereales instantáneos y fríos que contienen azúcar    Rema [place]yothi y helado que contienen azúcar    Condimentos, echo el ketchup    Bebidas con alto contenido en azúcar, echo refrescos, limonadas y jugos de frutas    Lo que usted necesita saber sobre las verduras y las frutas: Las verduras y las frutas pueden ser frescas, congeladas o enlatadas  Si es posible, trate de elegir opciones enlatadas bajas en sodio  Incluya nathaniel variedad de verduras y frutas, echo las siguientes:     1 Corpus leroy, anjali o melocotón medianos (aproximadamente ½ taza picada)    ½ banana pequeña    ½ taza de bayas, echo arándanos, fresas o moras    1 taza de verduras de hoja ladi crudas, echo [place], espinacas, col rizada o berza    ½ taza de verduras congeladas o enlatadas (sin sal agregada), echo judías verdes    ½ taza de fruta fresca, congelada o enlatada (enlatada en sirope liviano o jugo de fruta)    ½ taza de jugo de verduras o frutas    Limite o no consuma verduras y frutas elaboradas de las siguientes maneras:     [place] congelada, echo las cerezas, con azúcar añadida    Frutas en crema o salsa de [place]    Las verduras enlatadas son altas en sodio  Sauerkraut, vegetales en escabeche, y otros alimentos preparados con vinagre    Vegetales fritos o vegetales en mantequilla o salsas altas en grasas    Lo que necesita saber acerca de los alimentos con proteína:   Incluya alimentos proteicos magros o bajos en grasa, echo los siguientes:     Joanne evangelist (maryellen, [place]) sin piel    Pescado (sobre todo pescado con grasa, echo salmón, atún fresco o caballa)    [place] de res o de cerdo magra (lomo, carne molida extra Abraham Ruth)    Claras de [address] y sustitutos del huevo    1 taza de leche descremada o leche 1%    1½ onzas de queso descremado o bajo en grasas    6 onzas de yogurt descremado o bajo en grasas    Limite o no consuma alimentos con alto contenido de proteínas, [place] siguientes     [address] o [place], echo carne preparada con maíz, tocineta, jamón, perros calientes, y salchichas    Frijoles enlatados y joanne enlatadas o en pasta, echo joanne en conserva, dany, anchoas y [address] de [address] para emparedado, echo [place], [place], [place], y carne en rebanada    Joanne altas en grasas (biste estilo T-bone, carne molida para hamburguesas, costillas)    Huevos enteros y yemas de [place]    Leche [place], [place] al 2% y crema    Queso normal y queso fundido    Otras pautas que debe seguir:  Mantenga un peso saludable  Cross riesgo de enfermedad cardíaca es aún más alto si usted tiene sobrepeso  Cross médico podría sugerirle que adelgace si tiene sobrepeso  Usted puede perder peso si se propone consumir menos calorías y alimentos que tengan azúcar y grasas agregadas  El plan de alimentación DASH puede ayudarle a lograrlo  Jania buena forma de disminuir el consumo de calorías es consumiendo porciones más pequeñas en cada comida y menos meriendas entre comidas  Consulte a cross médico para obtener más información sobre cómo adelgazar  Realice actividad física con regularidad  El ejercicio regular puede ayudarle a alcanzar o mantener un peso saludable  El ejercicio regular también puede ayudarle a disminuir cross presión arterial y mejorar kevin niveles de colesterol  Duglas ejercicios moderados por 30 minutos o más todos los días de la [place]  Para bajar peso, asegúrese de ejercitarse por lo menos 60 minutos  Consulte con cross médico sobre un programa de ejercicio adecuado para usted  Limite el consumo de alcohol  Las mujeres deberían limitar el consumo de alcohol a 1 bebida por día  Los hombres deberían limitar el consumo de alcohol a 2 tragos al día  Un trago equivale a 12 onzas de cerveza, 5 onzas de vino o 1 onza y ½ de licor  Para más información:  [facility] [address]  [id number]  [place] [id number]  Phone: [phone]  Web Address: ItCheaper no    © Copyright Osmar Claude 2022 Information is for End User's use only and may not be sold, redistributed or otherwise used for commercial purposes  Esta información es sólo para uso en educación  Cross intención no es darle un consejo médico sobre enfermedades o tratamientos  Colsulte con cross [place] farmacéutico antes de seguir cualquier régimen médico para saber si es seguro y efectivo para usted

## 2023-04-03 NOTE — PROGRESS NOTES
Name: Tera Ann      : 1965      MRN: [id number]  Encounter Provider: Sonya Allen MD  Encounter Date: 4/3/2023   Encounter department: [address] [place] [address]     1  Primary hypertension  Assessment & Plan:  Previously well controlled on lifestyle modifications alone  Patient initiated on Lisinopril in 2022 which was dc'd after soft BP readings  Discussed exercise and diet modifications at length  Additional information provided in AVS    Patient with confirmed proteinuria on 2 separate readings  Most recent BMP (10/13/22) with GFR of 82  Will reinitiate patient on Lisinopril 20 mg QD  Will check BMP in 2 weeks time  Orders:  -     lisinopril (ZESTRIL) 20 mg tablet; Take 1 tablet (20 mg total) by mouth daily    2  Vitamin D deficiency  Assessment & Plan:  As evidenced by Vit D level of 17 9  Discussed importance of Vit D in order to absorb calcium  People who do not have enough Vit D can develop weak or soft bones, which can cause bones to break easily or change in shape  It can also cause weakness in muscles, which can lead to weakness, fatigue, and falls  Discussed increasing consumption of fruits (eg oranges), salmon, tuna, and cod liver oil  Also discussed sunlight as source of Vit D however this is not a recommended source of intake since this can predispose skin to sun damage  Supplementation with Vit D is the recommended treatment for Vit D deficiency  I have sent a prescription to their pharmacy  Patient is to take 50 000 units weekly for 12 weeks followed by 2000 units daily for 3 months, at which time we will recheck levels  Patient is exclusively Montenegrin-speaking  [facility]  services utilized for the entirety of this visit  Patient verbalizes understanding of assessment and agrees with the plan  Subjective     This is a very pleasant 65 y o  female who presents to the clinic to discuss lab results  Patient's medical conditions are stable unless noted otherwise above  Patient has not had any recent hospitalizations, or medical emergencies since last visit  Patient has no further complaints other than what is mentioned in the ROS  Review of Systems   Constitutional: Negative for chills and fever  HENT: Negative for congestion, ear pain, rhinorrhea and sinus pain  Eyes: Negative for visual disturbance  Respiratory: Negative for chest tightness, shortness of breath and wheezing  Cardiovascular: Negative for chest pain and palpitations  Gastrointestinal: Negative for abdominal pain, constipation, diarrhea and vomiting  Endocrine: Negative for polyuria  Genitourinary: Negative for dysuria  Musculoskeletal: Negative for arthralgias and myalgias  Neurological: Negative for dizziness, syncope and light-headedness  Psychiatric/Behavioral: Negative for hallucinations, self-injury and suicidal ideas  Past Medical History:   Diagnosis Date   • Hypertension      No past surgical history on file    Family History   Problem Relation Age of Onset   • No Known Problems Mother    • No Known Problems Maternal Grandmother    • No Known Problems Maternal Grandfather    • No Known Problems Paternal Grandmother    • No Known Problems Paternal Grandfather    • No Known Problems Maternal Aunt    • No Known Problems Maternal Aunt    • No Known Problems Maternal Aunt    • No Known Problems Maternal Aunt    • No Known Problems Maternal Aunt    • No Known Problems Paternal Aunt    • No Known Problems Paternal Aunt      Social History     Socioeconomic History   • Marital status: Single     Spouse name: None   • Number of children: None   • Years of education: None   • Highest education level: None   Occupational History   • None   Tobacco Use   • Smoking status: Never   • Smokeless tobacco: Never   Substance and Sexual Activity   • Alcohol use: Never   • Drug use: Never   • Sexual activity: None   Other Topics Concern   • None   Social History Narrative   • None     Social Determinants of Health     Financial Resource Strain: Low Risk    • Difficulty of Paying Living Expenses: Not hard at all   Food Insecurity: No Food Insecurity   • Worried About Running Out of Food in the Last Year: Never true   • Ran Out of Food in the Last Year: Never true   Transportation Needs: No Transportation Needs   • Lack of Transportation (Medical): No   • Lack of Transportation (Non-Medical): No   Physical Activity: Not on file   Stress: Not on file   Social Connections: Not on file   Intimate Partner Violence: Not on file   Housing Stability: Not on file     Current Outpatient Medications on File Prior to Visit   Medication Sig   • polyethylene glycol (GOLYTELY) 4000 mL solution Take as directed by the office  • albuterol (Ventolin HFA) 90 mcg/act inhaler Inhale 2 puffs every 6 (six) hours as needed for wheezing   • benzonatate (TESSALON) 200 MG capsule Take 1 capsule (200 mg total) by mouth 3 (three) times a day as needed for cough   • [START ON 6/5/2023] cholecalciferol (VITAMIN D3) 1,000 units tablet Take 2 tablets (2,000 Units total) by mouth daily Do not start before June 5, 2023  • diphenhydrAMINE (BENADRYL) 2 % cream Apply topically 3 (three) times a day as needed for itching   • ergocalciferol (VITAMIN D2) 50,000 units Take 1 capsule (50,000 Units total) by mouth once a week for 12 doses   • fluticasone (FLONASE) 50 mcg/act nasal spray SHAKE LIQUID AND USE 1 SPRAY IN EACH NOSTRIL DAILY   • fluticasone (Flovent HFA) 44 mcg/act inhaler Inhale 2 puffs 2 (two) times a day Rinse mouth after use     • guaiFENesin (ROBITUSSIN) 100 MG/5ML oral liquid Take 10 mL (200 mg total) by mouth 3 (three) times a day as needed for cough   • loratadine (CLARITIN) 10 mg tablet Take 1 tablet (10 mg total) by mouth daily   • metFORMIN (GLUCOPHAGE) 500 mg tablet Take 0 5 tablets (250 mg total) by mouth daily with breakfast for 7 days, THEN 1 "tablet (500 mg total) daily with breakfast for 7 days, THEN 1 tablet (500 mg total) 2 (two) times a day with meals  • methocarbamol (ROBAXIN) 500 mg tablet Take 1 tablet (500 mg total) by mouth 2 (two) times a day as needed for muscle spasms   • methylPREDNISolone 4 MG tablet therapy pack Use as directed on package   • pantoprazole (PROTONIX) 40 mg tablet Take 1 tablet (40 mg total) by mouth daily Take 30 minutes before you eat or drink in the morning     Allergies   Allergen Reactions   • Pineapple - Food Allergy Lip Swelling     Immunization History   Administered Date(s) Administered   • COVID-19 PFIZER VACCINE 0 3 ML IM 11/12/2021, 12/03/2021       Objective     /100 (BP Location: Left arm, Patient Position: Sitting, Cuff Size: Standard)   Pulse 93   Temp (!) 96 7 °F (35 9 °C) (Temporal)   Resp 16   Ht 5' 6\" (1 676 m)   Wt 83 kg (183 lb)   SpO2 97%   BMI 29 54 kg/m²     Physical Exam  Constitutional:       Appearance: Normal appearance  HENT:      Head: Normocephalic and atraumatic  Nose: Nose normal    Eyes:      Conjunctiva/sclera: Conjunctivae normal    Cardiovascular:      Rate and Rhythm: Normal rate and regular rhythm  Heart sounds: Normal heart sounds  Pulmonary:      Effort: Pulmonary effort is normal       Breath sounds: Normal breath sounds  Musculoskeletal:         General: Normal range of motion  Cervical back: Normal range of motion  Skin:     General: Skin is warm and dry  Neurological:      Mental Status: She is alert and oriented to person, place, and time     Psychiatric:         Behavior: Behavior normal        Kem Waldrop MD  "

## 2023-04-03 NOTE — ASSESSMENT & PLAN NOTE
Previously well controlled on lifestyle modifications alone  Patient initiated on Lisinopril in November 2022 which was dc'd after soft BP readings  Discussed exercise and diet modifications at length  Additional information provided in AVS    Patient with confirmed proteinuria on 2 separate readings  Most recent BMP (10/13/22) with GFR of 82  Will reinitiate patient on Lisinopril 20 mg QD  Will check BMP in 2 weeks time

## 2023-04-20 PROBLEM — Z72.820 SLEEP DEFICIENT: Status: ACTIVE | Noted: 2023-04-20

## 2023-05-04 ENCOUNTER — APPOINTMENT (OUTPATIENT)
Dept: LAB | Facility: CLINIC | Age: 58
End: 2023-05-04

## 2023-05-04 ENCOUNTER — CLINICAL SUPPORT (OUTPATIENT)
Dept: FAMILY MEDICINE CLINIC | Facility: CLINIC | Age: 58
End: 2023-05-04

## 2023-05-04 VITALS — DIASTOLIC BLOOD PRESSURE: 80 MMHG | SYSTOLIC BLOOD PRESSURE: 134 MMHG | HEART RATE: 90 BPM | OXYGEN SATURATION: 99 %

## 2023-05-04 DIAGNOSIS — I10 PRIMARY HYPERTENSION: Primary | ICD-10-CM

## 2023-05-04 DIAGNOSIS — B35.1 DERMATOPHYTOSIS OF NAIL: ICD-10-CM

## 2023-05-04 LAB
ALBUMIN SERPL BCP-MCNC: 3.8 G/DL (ref 3.5–5)
ALP SERPL-CCNC: 248 U/L (ref 46–116)
ALT SERPL W P-5'-P-CCNC: 104 U/L (ref 12–78)
AST SERPL W P-5'-P-CCNC: 72 U/L (ref 5–45)
BILIRUB DIRECT SERPL-MCNC: 0.14 MG/DL (ref 0–0.2)
BILIRUB SERPL-MCNC: 0.4 MG/DL (ref 0.2–1)
PROT SERPL-MCNC: 8.2 G/DL (ref 6.4–8.4)

## 2023-05-08 ENCOUNTER — TELEPHONE (OUTPATIENT)
Dept: GASTROENTEROLOGY | Facility: CLINIC | Age: 58
End: 2023-05-08

## 2023-05-11 ENCOUNTER — TELEPHONE (OUTPATIENT)
Dept: OTHER | Facility: OTHER | Age: 58
End: 2023-05-11

## 2023-05-11 NOTE — TELEPHONE ENCOUNTER
Pt daughter would like to come  the instruction paper work for pt endoscopy    Please give her a call back

## 2023-05-15 NOTE — TELEPHONE ENCOUNTER
Patient Ate on 5/15/23, daughter reschedule to 5/18/23 @ AT [facility] with Dr Carolyn Cervantes

## 2023-05-15 NOTE — TELEPHONE ENCOUNTER
Called went over instructions w/ patients daughter   Betty instructions she states she found the paper

## 2023-05-18 ENCOUNTER — HOSPITAL ENCOUNTER (OUTPATIENT)
Dept: GASTROENTEROLOGY | Facility: HOSPITAL | Age: 58
Setting detail: OUTPATIENT SURGERY
End: 2023-05-18
Attending: STUDENT IN AN ORGANIZED HEALTH CARE EDUCATION/TRAINING PROGRAM

## 2023-05-18 ENCOUNTER — ANESTHESIA EVENT (OUTPATIENT)
Dept: GASTROENTEROLOGY | Facility: HOSPITAL | Age: 58
End: 2023-05-18

## 2023-05-18 ENCOUNTER — ANESTHESIA (OUTPATIENT)
Dept: GASTROENTEROLOGY | Facility: HOSPITAL | Age: 58
End: 2023-05-18

## 2023-05-18 VITALS
OXYGEN SATURATION: 100 % | SYSTOLIC BLOOD PRESSURE: 142 MMHG | TEMPERATURE: 97.5 F | RESPIRATION RATE: 18 BRPM | HEART RATE: 73 BPM | DIASTOLIC BLOOD PRESSURE: 86 MMHG

## 2023-05-18 DIAGNOSIS — Z12.11 SCREENING FOR COLON CANCER: ICD-10-CM

## 2023-05-18 RX ORDER — SODIUM CHLORIDE 9 MG/ML
INJECTION, SOLUTION INTRAVENOUS CONTINUOUS PRN
Status: DISCONTINUED | OUTPATIENT
Start: 2023-05-18 | End: 2023-05-18

## 2023-05-18 RX ORDER — PROPOFOL 10 MG/ML
INJECTION, EMULSION INTRAVENOUS AS NEEDED
Status: DISCONTINUED | OUTPATIENT
Start: 2023-05-18 | End: 2023-05-18

## 2023-05-18 RX ADMIN — PROPOFOL 50 MG: 10 INJECTION, EMULSION INTRAVENOUS at 13:46

## 2023-05-18 RX ADMIN — PROPOFOL 50 MG: 10 INJECTION, EMULSION INTRAVENOUS at 13:49

## 2023-05-18 RX ADMIN — PROPOFOL 50 MG: 10 INJECTION, EMULSION INTRAVENOUS at 13:43

## 2023-05-18 RX ADMIN — SODIUM CHLORIDE: 0.9 INJECTION, SOLUTION INTRAVENOUS at 13:36

## 2023-05-18 RX ADMIN — PROPOFOL 50 MG: 10 INJECTION, EMULSION INTRAVENOUS at 13:59

## 2023-05-18 RX ADMIN — PROPOFOL 50 MG: 10 INJECTION, EMULSION INTRAVENOUS at 13:54

## 2023-05-18 NOTE — H&P
History and Physical - [facility]  Jerica Mann 63 y o  female MRN: [id number]        HPI: Jerica Mann is a 63y o  year old female who presents for colon cancer screening  REVIEW OF SYSTEMS: Per the HPI, and otherwise unremarkable  Historical Information   Past Medical History:   Diagnosis Date   • Hypertension      History reviewed  No pertinent surgical history    Social History   Social History     Substance and Sexual Activity   Alcohol Use Never     Social History     Substance and Sexual Activity   Drug Use Never     Social History     Tobacco Use   Smoking Status Never   Smokeless Tobacco Never     Family History   Problem Relation Age of Onset   • No Known Problems Mother    • No Known Problems Maternal Grandmother    • No Known Problems Maternal Grandfather    • No Known Problems Paternal Grandmother    • No Known Problems Paternal Grandfather    • No Known Problems Maternal Aunt    • No Known Problems Maternal Aunt    • No Known Problems Maternal Aunt    • No Known Problems Maternal Aunt    • No Known Problems Maternal Aunt    • No Known Problems Paternal Aunt    • No Known Problems Paternal Aunt        Meds/Allergies       Current Outpatient Medications:   •  ergocalciferol (VITAMIN D2) 50,000 units  •  losartan (COZAAR) 50 mg tablet  •  polyethylene glycol (GOLYTELY) 4000 mL solution  •  albuterol (Ventolin HFA) 90 mcg/act inhaler  •  benzonatate (TESSALON) 200 MG capsule  •  [START ON 6/5/2023] cholecalciferol (VITAMIN D3) 1,000 units tablet  •  diphenhydrAMINE (BENADRYL) 2 % cream  •  fluticasone (FLONASE) 50 mcg/act nasal spray  •  fluticasone (Flovent HFA) 44 mcg/act inhaler  •  guaiFENesin (ROBITUSSIN) 100 MG/5ML oral liquid  •  loratadine (CLARITIN) 10 mg tablet  •  metFORMIN (GLUCOPHAGE) 500 mg tablet  •  methocarbamol (ROBAXIN) 500 mg tablet  •  pantoprazole (PROTONIX) 40 mg tablet    Allergies   Allergen Reactions   • Pineapple - Food Allergy Lip Swelling • Shrimp (Diagnostic) - Food Allergy Swelling       Objective     /100   Pulse 85   Temp 97 5 °F (36 4 °C) (Temporal)   Resp 18   SpO2 95%       PHYSICAL EXAM    Gen: NAD  Head: NCAT  CV: RRR  CHEST: Clear  ABD: soft, NT/ND  EXT: no edema      ASSESSMENT/PLAN:  This is a 63y o  year old female here for colonoscopy, and she is stable and optimized for her procedure

## 2023-05-18 NOTE — ANESTHESIA PREPROCEDURE EVALUATION
Procedure:  COLONOSCOPY    Relevant Problems   CARDIO   (+) Hypertension      Other   (+) Chronic cough   (+) Prediabetes        Physical Exam    Airway    Mallampati score: II  TM Distance: >3 FB  Neck ROM: full     Dental   No notable dental hx     Cardiovascular  Rhythm: regular, Rate: normal, Cardiovascular exam normal    Pulmonary  Pulmonary exam normal Breath sounds clear to auscultation,     Other Findings        Anesthesia Plan  ASA Score- 2     Anesthesia Type- general with ASA Monitors  Additional Monitors:   Airway Plan:           Plan Factors-    Chart reviewed  Patient summary reviewed  Induction-     Postoperative Plan-     Informed Consent- Anesthetic plan and risks discussed with patient

## 2023-05-18 NOTE — ANESTHESIA POSTPROCEDURE EVALUATION
Post-Op Assessment Note    CV Status:  Stable    Pain management: adequate     Mental Status:  Alert and awake   Hydration Status:  Euvolemic   PONV Controlled:  Controlled   Airway Patency:  Patent      Post Op Vitals Reviewed: Yes      Staff: Anesthesiologist, CRNA         No notable events documented      BP      Temp      Pulse     Resp      SpO2      /86   Pulse 73   Temp 97 5 °F (36 4 °C) (Temporal)   Resp 18   SpO2 100%

## 2023-05-25 ENCOUNTER — TELEPHONE (OUTPATIENT)
Dept: GASTROENTEROLOGY | Facility: CLINIC | Age: 58
End: 2023-05-25

## 2023-05-25 NOTE — TELEPHONE ENCOUNTER
----- Message from Raymond Cam MD sent at 5/25/2023 10:46 AM EDT -----  Colonic polyps that were removed were adenomas repeat colonoscopy in 5 years

## 2023-05-25 NOTE — TELEPHONE ENCOUNTER
Left message in Norwegian  with full details and if they have any questions to feel free to call me back  Recall order has been entered

## 2023-06-12 ENCOUNTER — OFFICE VISIT (OUTPATIENT)
Dept: FAMILY MEDICINE CLINIC | Facility: CLINIC | Age: 58
End: 2023-06-12

## 2023-06-12 VITALS
DIASTOLIC BLOOD PRESSURE: 104 MMHG | RESPIRATION RATE: 18 BRPM | SYSTOLIC BLOOD PRESSURE: 162 MMHG | WEIGHT: 181.6 LBS | BODY MASS INDEX: 29.18 KG/M2 | OXYGEN SATURATION: 98 % | HEIGHT: 66 IN | TEMPERATURE: 96.9 F | HEART RATE: 76 BPM

## 2023-06-12 DIAGNOSIS — I10 PRIMARY HYPERTENSION: Primary | ICD-10-CM

## 2023-06-12 PROCEDURE — 99213 OFFICE O/P EST LOW 20 MIN: CPT | Performed by: INTERNAL MEDICINE

## 2023-06-12 NOTE — PROGRESS NOTES
Name: Caryn Grijalva      : 1965      MRN: [id number]  Encounter Provider: Isi Miller MD  Encounter Date: 2023   Encounter department: [address] [place] [address]  Primary hypertension  Assessment & Plan:  Follow up visit from 2 weeks prior  Remains uncontrolled despite initiating antihypertensive 2 months prior  BP Readings from Last 3 Encounters:   23 (!) 152/104   23 142/86   23 134/80     On repeat, blood pressure increased to 160/110, patient reports anxiety during repeat reading  Currently managed on Losartan 50 mg QD  Patient initiated on Lisinopril however we stopped this after patient developed cough  Patient reports worsening cough since initiating Lisinopril  Will stop Lisinopril and start Losartan  Advised ambulatory blood pressure monitoring for 2 weeks  Patient was to return for nursing visit for BP recheck and present home BP readings  Nursing visit yielded /100 and 134/80 on repeat  Patient reports she was recording her BP at home however she did not bring those values with her today  She reports she is unsure what the values were but that systolic varied significantly, at times going down to 100 and others around 140  Patient reports daily compliance on Losartan however has not taken it today  Discussed diet and lifestyle modifications  Patient noticeably anxious and tachycardic when recheking blood pressure  Discussed white coat hypertension and the importance of ambulatory monitoring  Patient does not wish to start another medication today  Patient is illiterate however she reports she can read and write numbers  Have provided her with a sheet to fill out and bring to next visit  Subjective     This is a very pleasant 63 y o  female who presents to the clinic for management of their chronic medical conditions    Patient's medical conditions are stable unless noted otherwise above  Patient has not had any recent hospitalizations, or medical emergencies since last visit  Patient has no further complaints other than what is mentioned in the ROS  Review of Systems   Constitutional: Negative for chills and fever  HENT: Negative for congestion, ear pain, rhinorrhea and sinus pain  Eyes: Negative for visual disturbance  Respiratory: Negative for chest tightness, shortness of breath and wheezing  Cardiovascular: Negative for chest pain and palpitations  Gastrointestinal: Negative for abdominal pain, constipation, diarrhea and vomiting  Endocrine: Negative for polyuria  Genitourinary: Negative for dysuria  Musculoskeletal: Negative for arthralgias and myalgias  Neurological: Negative for dizziness, syncope and light-headedness  Psychiatric/Behavioral: Negative for hallucinations, self-injury and suicidal ideas  Past Medical History:   Diagnosis Date   • Hypertension      No past surgical history on file    Family History   Problem Relation Age of Onset   • No Known Problems Mother    • No Known Problems Maternal Grandmother    • No Known Problems Maternal Grandfather    • No Known Problems Paternal Grandmother    • No Known Problems Paternal Grandfather    • No Known Problems Maternal Aunt    • No Known Problems Maternal Aunt    • No Known Problems Maternal Aunt    • No Known Problems Maternal Aunt    • No Known Problems Maternal Aunt    • No Known Problems Paternal Aunt    • No Known Problems Paternal Aunt      Social History     Socioeconomic History   • Marital status: Single     Spouse name: Not on file   • Number of children: Not on file   • Years of education: Not on file   • Highest education level: Not on file   Occupational History   • Not on file   Tobacco Use   • Smoking status: Never   • Smokeless tobacco: Never   Substance and Sexual Activity   • Alcohol use: Never   • Drug use: Never   • Sexual activity: Not on file   Other Topics Concern   • Not on file   Social History Narrative   • Not on file     Social Determinants of Health     Financial Resource Strain: Low Risk  (10/13/2022)    Overall Financial Resource Strain (CARDIA)    • Difficulty of Paying Living Expenses: Not hard at all   Food Insecurity: No Food Insecurity (10/13/2022)    Hunger Vital Sign    • Worried About Running Out of Food in the Last Year: Never true    • Ran Out of Food in the Last Year: Never true   Transportation Needs: No Transportation Needs (10/13/2022)    PRAPARE - Transportation    • Lack of Transportation (Medical): No    • Lack of Transportation (Non-Medical): No   Physical Activity: Not on file   Stress: Not on file   Social Connections: Not on file   Intimate Partner Violence: Not on file   Housing Stability: Not on file     Current Outpatient Medications on File Prior to Visit   Medication Sig   • albuterol (Ventolin HFA) 90 mcg/act inhaler Inhale 2 puffs every 6 (six) hours as needed for wheezing   • benzonatate (TESSALON) 200 MG capsule Take 1 capsule (200 mg total) by mouth 3 (three) times a day as needed for cough   • cholecalciferol (VITAMIN D3) 1,000 units tablet Take 2 tablets (2,000 Units total) by mouth daily Do not start before June 5, 2023  • diphenhydrAMINE (BENADRYL) 2 % cream Apply topically 3 (three) times a day as needed for itching   • ergocalciferol (VITAMIN D2) 50,000 units Take 1 capsule (50,000 Units total) by mouth once a week for 12 doses   • fluticasone (FLONASE) 50 mcg/act nasal spray SHAKE LIQUID AND USE 1 SPRAY IN EACH NOSTRIL DAILY   • fluticasone (Flovent HFA) 44 mcg/act inhaler Inhale 2 puffs 2 (two) times a day Rinse mouth after use     • guaiFENesin (ROBITUSSIN) 100 MG/5ML oral liquid Take 10 mL (200 mg total) by mouth 3 (three) times a day as needed for cough   • loratadine (CLARITIN) 10 mg tablet Take 1 tablet (10 mg total) by mouth daily   • losartan (COZAAR) 50 mg tablet Take 1 tablet (50 mg total) by mouth daily   • metFORMIN (GLUCOPHAGE) "500 mg tablet Take 0 5 tablets (250 mg total) by mouth daily with breakfast for 7 days, THEN 1 tablet (500 mg total) daily with breakfast for 7 days, THEN 1 tablet (500 mg total) 2 (two) times a day with meals  • methocarbamol (ROBAXIN) 500 mg tablet Take 1 tablet (500 mg total) by mouth 2 (two) times a day as needed for muscle spasms   • pantoprazole (PROTONIX) 40 mg tablet Take 1 tablet (40 mg total) by mouth daily Take 30 minutes before you eat or drink in the morning     Allergies   Allergen Reactions   • Pineapple - Food Allergy Lip Swelling   • Shrimp (Diagnostic) - Food Allergy Swelling     Immunization History   Administered Date(s) Administered   • COVID-19 PFIZER VACCINE 0 3 ML IM 11/12/2021, 12/03/2021       Objective     BP (!) 152/104 (BP Location: Left arm, Patient Position: Sitting, Cuff Size: Adult) Comment: Pt sates did not take bp meds  Pulse 85   Temp (!) 96 9 °F (36 1 °C) (Temporal)   Resp 18   Ht 5' 6\" (1 676 m)   Wt 82 4 kg (181 lb 9 6 oz)   SpO2 99%   BMI 29 31 kg/m²     Physical Exam  Constitutional:       Appearance: Normal appearance  HENT:      Head: Normocephalic and atraumatic  Nose: Nose normal    Eyes:      Conjunctiva/sclera: Conjunctivae normal    Cardiovascular:      Rate and Rhythm: Normal rate and regular rhythm  Heart sounds: Normal heart sounds  Pulmonary:      Effort: Pulmonary effort is normal       Breath sounds: Normal breath sounds  Musculoskeletal:         General: Normal range of motion  Cervical back: Normal range of motion  Skin:     General: Skin is warm and dry  Neurological:      Mental Status: She is alert and oriented to person, place, and time     Psychiatric:         Behavior: Behavior normal        Eder Dominguez MD  "

## 2023-06-12 NOTE — ASSESSMENT & PLAN NOTE
Follow up visit from 2 weeks prior  Remains uncontrolled despite initiating antihypertensive 2 months prior  BP Readings from Last 3 Encounters:   06/12/23 (!) 152/104   05/18/23 142/86   05/04/23 134/80     On repeat, blood pressure increased to 160/110, patient reports anxiety during repeat reading  Currently managed on Losartan 50 mg QD  Patient initiated on Lisinopril however we stopped this after patient developed cough  Patient reports worsening cough since initiating Lisinopril  Will stop Lisinopril and start Losartan  Advised ambulatory blood pressure monitoring for 2 weeks  Patient was to return for nursing visit for BP recheck and present home BP readings  Nursing visit yielded /100 and 134/80 on repeat  Patient reports she was recording her BP at home however she did not bring those values with her today  She reports she is unsure what the values were but that systolic varied significantly, at times going down to 100 and others around 140  Patient reports daily compliance on Losartan however has not taken it today  Discussed diet and lifestyle modifications  Patient noticeably anxious and tachycardic when recheking blood pressure  Discussed white coat hypertension and the importance of ambulatory monitoring  Patient does not wish to start another medication today  Patient is illiterate however she reports she can read and write numbers  Have provided her with a sheet to fill out and bring to next visit

## 2023-06-12 NOTE — PATIENT INSTRUCTIONS
Cómo leer la presión arterial   LO QUE NECESITA SABER:   La presión arterial es la fuerza de la blanche empujando contra las saunders de las arterias  Los resultados de la presión arterial se escriben echo 2 números  El yolande, o número de [place], se llama presión arterial sistólica  Esta es la presión causada por el corazón que empuja la blanche hacia el cuerpo  El [place], o número de [place], es la presión arterial diastólica  Esta es la presión cuando el corazón se relaja y se llena de [place]  Pregúntele a cross médico cuál debe ser cross presión arterial  En la mayoría de las personas, nathaniel buena meta de presión arterial es menos de 140/90  INSTRUCCIONES SOBRE EL JAMESON HOSPITALARIA:   Llame a cross médico si:  Cross presión arterial está más elevada o más baja de lo que se le napoles indicado que debe estar  Usted tiene preguntas o inquietudes acerca de cross condición o cuidado  Por qué debe medirse la presión arterial: Es posible que usted no tenga ningún signo o síntoma de presión arterial jameson  Es posible que necesite medirse la presión arterial regularmente para saber con qué frecuencia es jameson  La presión arterial jameson aumenta cross riesgo de derrame cerebral, ataque cardíaco o enfermedad renal  Es posible que necesite tessa un medicamento para mantener la presión arterial a un nivel normal  Anote y lleve un registro de las lecturas de cross presión arterial  Cross médico puede utilizar los [place] para abdiel si kevin medicamentos para la presión arterial están funcionando  Frecuencia que debe medir cross presión arterial: Cross médico le puede recomendar que usted se mida la presión arterial al menos 2 veces al día  Duglas la medición a la misma hora todos los días, por ejemplo nathaniel en la mañana y la otra en la noche  Cómo tessa las lecturas de la presión arterial: Usted puede medirse la presión arterial en casa con un monitor digital para la presión arterial  Se recomienda leer las instrucciones que vienen con el tensiómetro   El monitor viene con un brazalete ajustable  Pregunte a cross médico si el brazalete es del tamaño correcto  No coma, cinda, fume o gaviota ejercicio chandra 30 minutos antes de medirse la presión arterial     Descanse por 5 minutos antes de comenzar  No hable mientras se mide la presión arterial     Siéntese con los pies planos en el piso y la espalda contra nathaniel silla  Extienda cross brazo y apóyelo en nathaniel superficie plana  El brazo debe estar al nivel del pecho  No mueva el brazo mientras se mide la presión arterial     Asegúrese de que el manguito esté totalmente desinflado  Coloque el manguito de presión arterial contra la piel desnuda a aproximadamente 1 pulgada (2 5 cm) por encima del codo  Coloque la rivas alrededor del brazo de modo que [place]  La lectura de la presión arterial podría ser incorrecta si la abrazadera está muy floja o suelta  Si está usando nathaniel [place], envuelva el brazalete cómodamente alrededor de la [place]  Mantenga la [place] en el mismo nivel que cross corazón  Encienda el monitor de la presión arterial y Eason Marialuisa instrucciones  Anote el valor de cross presión arterial, la fecha, la hora y en qué brazo se midió la presión  Mídase la presión arterial 2 veces y escriba ambas lecturas  Use el mismo brazo cada vez  Estas lecturas pueden ser tomadas con 1 minuto de diferencia  Marysol Hun necesita saber:  No tome la presión arterial en un brazo lesionado, o si tiene nathaniel sonda intravenosa o nathaniel derivación  Es probable que la lectura no sea correcta  No deje de tessa kevin medicamentos si cross presión arterial está en cross valor objetivo  Si tiene la presión arterial en el valor objetivo significa que el medicamento está funcionando correctamente  Tómese kevin medicamentos para la presión arterial echo se le indique      Acuda a la consulta de control con cross médico según las indicaciones: Lleve el registro de kevin lecturas de presión arterial  Lleve también el aparato para medir la presión arterial  Los médicos pueden comprobar que usted está usando el aparato correctamente  Anote kevin preguntas para que se acuerde de hacerlas chandra kevin visitas  © [facility], 1928–1983 2022 Information is for End User's use only and may not be sold, redistributed or otherwise used for commercial purposes  Esta información es sólo para uso en educación  Cross intención no es darle un consejo médico sobre enfermedades o tratamientos  Colsulte con cross Yunier Pert farmacéutico antes de seguir cualquier régimen médico para saber si es seguro y efectivo para usted

## 2023-07-26 ENCOUNTER — OFFICE VISIT (OUTPATIENT)
Dept: FAMILY MEDICINE CLINIC | Facility: CLINIC | Age: 58
End: 2023-07-26

## 2023-07-26 VITALS
WEIGHT: 183 LBS | HEART RATE: 104 BPM | SYSTOLIC BLOOD PRESSURE: 146 MMHG | HEIGHT: 66 IN | OXYGEN SATURATION: 98 % | BODY MASS INDEX: 29.41 KG/M2 | RESPIRATION RATE: 18 BRPM | TEMPERATURE: 98.6 F | DIASTOLIC BLOOD PRESSURE: 82 MMHG

## 2023-07-26 DIAGNOSIS — R50.9 FEVER, UNSPECIFIED FEVER CAUSE: ICD-10-CM

## 2023-07-26 DIAGNOSIS — J02.9 SORE THROAT: ICD-10-CM

## 2023-07-26 DIAGNOSIS — R11.0 NAUSEA: ICD-10-CM

## 2023-07-26 DIAGNOSIS — J02.0 STREP PHARYNGITIS: Primary | ICD-10-CM

## 2023-07-26 LAB
S PYO AG THROAT QL: POSITIVE
SARS-COV-2 AG UPPER RESP QL IA: NEGATIVE
VALID CONTROL: NORMAL

## 2023-07-26 PROCEDURE — 87880 STREP A ASSAY W/OPTIC: CPT

## 2023-07-26 PROCEDURE — 99214 OFFICE O/P EST MOD 30 MIN: CPT

## 2023-07-26 PROCEDURE — 87811 SARS-COV-2 COVID19 W/OPTIC: CPT

## 2023-07-26 RX ORDER — NAPROXEN 500 MG/1
500 TABLET ORAL 2 TIMES DAILY PRN
Qty: 30 TABLET | Refills: 0 | Status: CANCELLED | OUTPATIENT
Start: 2023-07-26

## 2023-07-26 RX ORDER — ONDANSETRON 4 MG/1
4 TABLET, FILM COATED ORAL EVERY 8 HOURS PRN
Qty: 20 TABLET | Refills: 0 | Status: SHIPPED | OUTPATIENT
Start: 2023-07-26

## 2023-07-26 RX ORDER — AMOXICILLIN 500 MG/1
500 TABLET, FILM COATED ORAL 2 TIMES DAILY
Qty: 20 TABLET | Refills: 0 | Status: CANCELLED | OUTPATIENT
Start: 2023-07-26 | End: 2023-08-05

## 2023-07-26 NOTE — PROGRESS NOTES
Name: Isabelle Shaw      : 1965      MRN: [id number]  Encounter Provider: MT Castrejon  Encounter Date: 2023   Encounter department: [address] [facility]     1. Strep pharyngitis  Assessment & Plan:  Rapid strep positive. - Start with Zofran 4 mg q 8 hours PRN to help tolerate ibuprofen and antibiotic. - Amoxicillin 500 mg BID x 10 days. Pt states that she has exactly this medication at home, having [place], and it is not . She insists that she does not need a prescription for this medication.   - Tylenol, ibuprofen, cool fluids for sore throat and fever. 2. Sore throat  -     POCT rapid strepA  -     POCT Rapid Covid Ag    3. Fever, unspecified fever cause  -     POCT rapid strepA  -     POCT Rapid Covid Ag    4. Nausea  -     ondansetron (ZOFRAN) 4 mg tablet; Take 1 tablet (4 mg total) by mouth every 8 (eight) hours as needed for nausea or vomiting         Subjective     HPI     [place] language interpretation services were utilized for this visit. Audie Jesus presents to the office for a SAME DAY appt for c/o vomiting and headache. Symptoms started yesterday. Positive for sore throat, headache, vomiting x2, abdominal pain, tactile fever, occasional dry cough. Negative for ear pain, congestion, SOB, or wheezing. Sick contacts include: none. Treatments tried include: ibuprofen was ineffective. Review of Systems   Constitutional: Positive for fatigue and fever. Negative for activity change, appetite change and unexpected weight change. HENT: Positive for sore throat. Negative for congestion, ear pain, rhinorrhea and trouble swallowing. Eyes: Negative for pain, discharge, redness and itching. Respiratory: Positive for cough. Negative for shortness of breath and wheezing. Cardiovascular: Negative for chest pain and palpitations.    Gastrointestinal: Positive for abdominal pain, nausea and vomiting. Negative for blood in stool, constipation and diarrhea. Musculoskeletal: Positive for myalgias. Skin: Negative for rash. Neurological: Positive for headaches. Negative for dizziness, weakness and numbness. All other systems reviewed and are negative. Past Medical History:   Diagnosis Date   • Hypertension      No past surgical history on file. Family History   Problem Relation Age of Onset   • No Known Problems Mother    • No Known Problems Maternal Grandmother    • No Known Problems Maternal Grandfather    • No Known Problems Paternal Grandmother    • No Known Problems Paternal Grandfather    • No Known Problems Maternal Aunt    • No Known Problems Maternal Aunt    • No Known Problems Maternal Aunt    • No Known Problems Maternal Aunt    • No Known Problems Maternal Aunt    • No Known Problems Paternal Aunt    • No Known Problems Paternal Aunt      Social History     Socioeconomic History   • Marital status: Single     Spouse name: None   • Number of children: None   • Years of education: None   • Highest education level: None   Occupational History   • None   Tobacco Use   • Smoking status: Never     Passive exposure: Never   • Smokeless tobacco: Never   Substance and Sexual Activity   • Alcohol use: Never   • Drug use: Never   • Sexual activity: None   Other Topics Concern   • None   Social History Narrative   • None     Social Determinants of Health     Financial Resource Strain: Low Risk  (10/13/2022)    Overall Financial Resource Strain (CARDIA)    • Difficulty of Paying Living Expenses: Not hard at all   Food Insecurity: No Food Insecurity (10/13/2022)    Hunger Vital Sign    • Worried About Running Out of Food in the Last Year: Never true    • Ran Out of Food in the Last Year: Never true   Transportation Needs: No Transportation Needs (10/13/2022)    PRAPARE - Transportation    • Lack of Transportation (Medical): No    • Lack of Transportation (Non-Medical):  No   Physical Activity: Not on file   Stress: Not on file   Social Connections: Not on file   Intimate Partner Violence: Not on file   Housing Stability: Not on file     Current Outpatient Medications on File Prior to Visit   Medication Sig   • albuterol (Ventolin HFA) 90 mcg/act inhaler Inhale 2 puffs every 6 (six) hours as needed for wheezing   • benzonatate (TESSALON) 200 MG capsule Take 1 capsule (200 mg total) by mouth 3 (three) times a day as needed for cough   • cholecalciferol (VITAMIN D3) 1,000 units tablet Take 2 tablets (2,000 Units total) by mouth daily Do not start before June 5, 2023. • diphenhydrAMINE (BENADRYL) 2 % cream Apply topically 3 (three) times a day as needed for itching   • ergocalciferol (VITAMIN D2) 50,000 units Take 1 capsule (50,000 Units total) by mouth once a week for 12 doses   • fluticasone (FLONASE) 50 mcg/act nasal spray SHAKE LIQUID AND USE 1 SPRAY IN EACH NOSTRIL DAILY   • fluticasone (Flovent HFA) 44 mcg/act inhaler Inhale 2 puffs 2 (two) times a day Rinse mouth after use. • guaiFENesin (ROBITUSSIN) 100 MG/5ML oral liquid Take 10 mL (200 mg total) by mouth 3 (three) times a day as needed for cough   • loratadine (CLARITIN) 10 mg tablet Take 1 tablet (10 mg total) by mouth daily   • losartan (COZAAR) 50 mg tablet Take 1 tablet (50 mg total) by mouth daily   • metFORMIN (GLUCOPHAGE) 500 mg tablet Take 0.5 tablets (250 mg total) by mouth daily with breakfast for 7 days, THEN 1 tablet (500 mg total) daily with breakfast for 7 days, THEN 1 tablet (500 mg total) 2 (two) times a day with meals.    • methocarbamol (ROBAXIN) 500 mg tablet Take 1 tablet (500 mg total) by mouth 2 (two) times a day as needed for muscle spasms   • pantoprazole (PROTONIX) 40 mg tablet Take 1 tablet (40 mg total) by mouth daily Take 30 minutes before you eat or drink in the morning     Allergies   Allergen Reactions   • Pineapple - Food Allergy Lip Swelling   • Shrimp (Diagnostic) - Food Allergy Swelling Immunization History   Administered Date(s) Administered   • COVID-19 PFIZER VACCINE 0.3 ML IM 11/12/2021, 12/03/2021       Objective     /82 (BP Location: Left arm, Patient Position: Sitting, Cuff Size: Standard)   Pulse 104   Temp 98.6 °F (37 °C) (Temporal)   Resp 18   Ht 5' 6" (1.676 m)   Wt 83 kg (183 lb)   SpO2 98%   BMI 29.54 kg/m²     Physical Exam  Vitals reviewed. Constitutional:       General: She is not in acute distress. Appearance: She is overweight. She is not ill-appearing or diaphoretic. HENT:      Head: Normocephalic and atraumatic. Right Ear: Tympanic membrane, ear canal and external ear normal.      Left Ear: Tympanic membrane, ear canal and external ear normal.      Nose: Nose normal.      Mouth/Throat:      Mouth: Mucous membranes are moist.      Pharynx: Posterior oropharyngeal erythema present. Tonsils: Tonsillar exudate present. 2+ on the right. 2+ on the left. Eyes:      General: Lids are normal.      Conjunctiva/sclera: Conjunctivae normal.      Pupils: Pupils are equal, round, and reactive to light. Neck:      Comments: Anterior tenderness bilaterally. Cardiovascular:      Rate and Rhythm: Normal rate and regular rhythm. Heart sounds: Normal heart sounds. No murmur heard. Pulmonary:      Effort: Pulmonary effort is normal. No tachypnea. Breath sounds: Normal breath sounds. No decreased breath sounds or wheezing. Abdominal:      General: Bowel sounds are normal. There is no distension. Palpations: Abdomen is soft. Tenderness: There is no abdominal tenderness. There is no guarding. Musculoskeletal:      Cervical back: Neck supple. Lymphadenopathy:      Cervical: No cervical adenopathy. Skin:     General: Skin is warm and dry. Neurological:      Mental Status: She is alert and oriented to person, place, and time.    Psychiatric:         Attention and Perception: Attention normal.         Mood and Affect: Mood and affect normal.       Syliva Damian, MT

## 2023-07-26 NOTE — ASSESSMENT & PLAN NOTE
Rapid strep positive. - Start with Zofran 4 mg q 8 hours PRN to help tolerate ibuprofen and antibiotic. - Amoxicillin 500 mg BID x 10 days. Pt states that she has exactly this medication at home, [place], and it is not . She insists that she does not need a prescription for this medication.   - Tylenol, ibuprofen, cool fluids for sore throat and fever.

## 2023-12-22 ENCOUNTER — OFFICE VISIT (OUTPATIENT)
Dept: FAMILY MEDICINE CLINIC | Facility: CLINIC | Age: 58
End: 2023-12-22

## 2023-12-22 VITALS
HEART RATE: 88 BPM | OXYGEN SATURATION: 98 % | TEMPERATURE: 98.5 F | SYSTOLIC BLOOD PRESSURE: 140 MMHG | HEIGHT: 66 IN | WEIGHT: 181 LBS | RESPIRATION RATE: 16 BRPM | BODY MASS INDEX: 29.09 KG/M2 | DIASTOLIC BLOOD PRESSURE: 98 MMHG

## 2023-12-22 DIAGNOSIS — Z28.21 REFUSED INFLUENZA VACCINE: ICD-10-CM

## 2023-12-22 DIAGNOSIS — I10 PRIMARY HYPERTENSION: ICD-10-CM

## 2023-12-22 DIAGNOSIS — Z12.31 BREAST CANCER SCREENING BY MAMMOGRAM: ICD-10-CM

## 2023-12-22 DIAGNOSIS — E55.9 VITAMIN D DEFICIENCY: ICD-10-CM

## 2023-12-22 DIAGNOSIS — E66.9 OBESITY (BMI 30-39.9): ICD-10-CM

## 2023-12-22 DIAGNOSIS — J02.9 SORE THROAT: ICD-10-CM

## 2023-12-22 DIAGNOSIS — R73.03 PREDIABETES: Primary | ICD-10-CM

## 2023-12-22 DIAGNOSIS — Z12.4 CERVICAL CANCER SCREENING: ICD-10-CM

## 2023-12-22 PROBLEM — J02.0 STREP PHARYNGITIS: Status: RESOLVED | Noted: 2023-07-26 | Resolved: 2023-12-22

## 2023-12-22 LAB
SARS-COV-2 AG UPPER RESP QL IA: NEGATIVE
VALID CONTROL: NORMAL

## 2023-12-22 RX ORDER — LOSARTAN POTASSIUM 100 MG/1
100 TABLET ORAL DAILY
Qty: 90 TABLET | Refills: 0 | Status: SHIPPED | OUTPATIENT
Start: 2023-12-22 | End: 2023-12-22

## 2023-12-22 RX ORDER — LOSARTAN POTASSIUM 50 MG/1
50 TABLET ORAL DAILY
Qty: 90 TABLET | Refills: 0 | Status: SHIPPED | OUTPATIENT
Start: 2023-12-22

## 2023-12-22 NOTE — PROGRESS NOTES
Name: Elizabet Martinez      : 1965      MRN: [id number]  Encounter Provider: MT Junior  Encounter Date: 2023   Encounter department: [facility]    Assessment & Plan     1. Prediabetes  Assessment & Plan:  Lab Results   Component Value Date    HGBA1C 5.6 2023     Repeat A1c ordered     Orders:  -     Comprehensive metabolic panel; Future  -     Hemoglobin A1C; Future  -     Albumin / creatinine urine ratio    2. Primary hypertension  Assessment & Plan:  BP Readings from Last 3 Encounters:   23 140/98   23 146/82   23 (!) 162/104      Recommend increasing losartan to 100 mg daily   Patient does not wish to increase medication, discussed importance of BP being within goal, continue at 50 mg daily at this time   Discussed lifestyle and diet interventions   Recommend home BP monitoring and bring record to follow up with PCP for review     Orders:  -     losartan (COZAAR) 50 mg tablet; Take 1 tablet (50 mg total) by mouth daily  -     CBC and differential; Future  -     Comprehensive metabolic panel; Future  -     Hemoglobin A1C; Future  -     Lipid panel; Future  -     Albumin / creatinine urine ratio    3. Sore throat  -     POCT Rapid Covid Ag    4. Vitamin D deficiency  -     Vitamin D 25 hydroxy; Future    5. Obesity (BMI 30-39.9)  -     CBC and differential; Future  -     Comprehensive metabolic panel; Future  -     Hemoglobin A1C; Future  -     Lipid panel; Future    6. Cervical cancer screening  -     Ambulatory Referral to Obstetrics / Gynecology; Future    7. Breast cancer screening by mammogram  -     Mammo screening bilateral w 3d & cad; Future; Expected date: 2023    8. Refused influenza vaccine      Rapid COVID test negative     BMI Counseling: Body mass index is 29.21 kg/m². The BMI is above normal. Nutrition recommendations include decreasing portion sizes, encouraging healthy choices of fruits and  vegetables, decreasing fast food intake, consuming healthier snacks and limiting drinks that contain sugar. Exercise recommendations include exercising 3-5 times per week. Rationale for BMI follow-up plan is due to patient being overweight or obese.     Depression Screening and Follow-up Plan: Patient was screened for depression during today's encounter. They screened negative with a PHQ-2 score of 0.        Subjective     57 YO female with past medical history of hypertension presents today for follow up. Reports that her granddaughter tested positive for COVID and she has a sore throat today. Would like to be tested for COVID.     The following portions of the patient's history were reviewed and updated as appropriate: allergies, current medications, past family history, past medical history, past social history, past surgical history and problem list.        Review of Systems   Constitutional:  Negative for chills and fever.   HENT:  Positive for sore throat. Negative for ear pain.    Eyes:  Negative for pain and visual disturbance.   Respiratory:  Negative for cough and shortness of breath.    Cardiovascular:  Negative for chest pain and palpitations.   Gastrointestinal:  Negative for abdominal pain and vomiting.   Genitourinary:  Negative for dysuria and hematuria.   Musculoskeletal:  Negative for arthralgias and back pain.   Skin:  Negative for color change and rash.   Neurological:  Negative for seizures and syncope.   All other systems reviewed and are negative.      Past Medical History:   Diagnosis Date   • Hypertension      No past surgical history on file.  Family History   Problem Relation Age of Onset   • No Known Problems Mother    • No Known Problems Maternal Grandmother    • No Known Problems Maternal Grandfather    • No Known Problems Paternal Grandmother    • No Known Problems Paternal Grandfather    • No Known Problems Maternal Aunt    • No Known Problems Maternal Aunt    • No Known Problems  Maternal Aunt    • No Known Problems Maternal Aunt    • No Known Problems Maternal Aunt    • No Known Problems Paternal Aunt    • No Known Problems Paternal Aunt      Social History     Socioeconomic History   • Marital status: Single     Spouse name: None   • Number of children: None   • Years of education: None   • Highest education level: None   Occupational History   • None   Tobacco Use   • Smoking status: Never     Passive exposure: Never   • Smokeless tobacco: Never   Substance and Sexual Activity   • Alcohol use: Never   • Drug use: Never   • Sexual activity: None   Other Topics Concern   • None   Social History Narrative   • None     Social Determinants of Health     Financial Resource Strain: Low Risk  (10/13/2022)    Overall Financial Resource Strain (CARDIA)    • Difficulty of Paying Living Expenses: Not hard at all   Food Insecurity: No Food Insecurity (10/13/2022)    Hunger Vital Sign    • Worried About Running Out of Food in the Last Year: Never true    • Ran Out of Food in the Last Year: Never true   Transportation Needs: No Transportation Needs (10/13/2022)    PRAPARE - Transportation    • Lack of Transportation (Medical): No    • Lack of Transportation (Non-Medical): No   Physical Activity: Unknown (3/6/2020)    Received from [facility]    Exercise Vital Sign    • Days of Exercise per Week: Patient refused    • Minutes of Exercise per Session: Patient refused   Stress: No Stress Concern Present (3/6/2020)    Received from [facility]    [employer] [place] [employer] - Occupational Stress Questionnaire    • Feeling of Stress : Not at all   Social Connections: Not on file   Intimate Partner Violence: Unknown (3/6/2020)    Received from [facility]    Humiliation, Afraid, Rape, and Kick questionnaire    • Fear of Current or Ex-Partner: Patient refused    • Emotionally Abused: Patient refused    • Physically Abused: Patient refused    •  Sexually Abused: Patient refused   Housing Stability: Not on file     Current Outpatient Medications on File Prior to Visit   Medication Sig   • albuterol (Ventolin HFA) 90 mcg/act inhaler Inhale 2 puffs every 6 (six) hours as needed for wheezing   • cholecalciferol (VITAMIN D3) 1,000 units tablet Take 2 tablets (2,000 Units total) by mouth daily Do not start before June 5, 2023.   • diphenhydrAMINE (BENADRYL) 2 % cream Apply topically 3 (three) times a day as needed for itching   • ergocalciferol (VITAMIN D2) 50,000 units Take 1 capsule (50,000 Units total) by mouth once a week for 12 doses   • fluticasone (FLONASE) 50 mcg/act nasal spray SHAKE LIQUID AND USE 1 SPRAY IN EACH NOSTRIL DAILY   • fluticasone (Flovent HFA) 44 mcg/act inhaler Inhale 2 puffs 2 (two) times a day Rinse mouth after use.   • loratadine (CLARITIN) 10 mg tablet Take 1 tablet (10 mg total) by mouth daily   • [DISCONTINUED] benzonatate (TESSALON) 200 MG capsule Take 1 capsule (200 mg total) by mouth 3 (three) times a day as needed for cough   • [DISCONTINUED] guaiFENesin (ROBITUSSIN) 100 MG/5ML oral liquid Take 10 mL (200 mg total) by mouth 3 (three) times a day as needed for cough   • [DISCONTINUED] losartan (COZAAR) 50 mg tablet Take 1 tablet (50 mg total) by mouth daily   • [DISCONTINUED] metFORMIN (GLUCOPHAGE) 500 mg tablet Take 0.5 tablets (250 mg total) by mouth daily with breakfast for 7 days, THEN 1 tablet (500 mg total) daily with breakfast for 7 days, THEN 1 tablet (500 mg total) 2 (two) times a day with meals.   • [DISCONTINUED] methocarbamol (ROBAXIN) 500 mg tablet Take 1 tablet (500 mg total) by mouth 2 (two) times a day as needed for muscle spasms   • [DISCONTINUED] ondansetron (ZOFRAN) 4 mg tablet Take 1 tablet (4 mg total) by mouth every 8 (eight) hours as needed for nausea or vomiting   • [DISCONTINUED] pantoprazole (PROTONIX) 40 mg tablet Take 1 tablet (40 mg total) by mouth daily Take 30 minutes before you eat or drink in the  "morning     Allergies   Allergen Reactions   • Pineapple - Food Allergy Lip Swelling   • Shrimp (Diagnostic) - Food Allergy Swelling     Immunization History   Administered Date(s) Administered   • COVID-19 PFIZER VACCINE 0.3 ML IM 11/12/2021, 12/03/2021       Objective     /98 (BP Location: Right arm, Patient Position: Sitting, Cuff Size: Standard)   Pulse 88   Temp 98.5 °F (36.9 °C) (Temporal)   Resp 16   Ht 5' 6\" (1.676 m)   Wt 82.1 kg (181 lb)   SpO2 98%   BMI 29.21 kg/m²     Physical Exam  Vitals and nursing note reviewed.   Constitutional:       General: She is not in acute distress.     Appearance: She is well-developed. She is not diaphoretic.   HENT:      Head: Normocephalic and atraumatic.      Right Ear: Tympanic membrane and external ear normal.      Left Ear: Tympanic membrane and external ear normal.      Mouth/Throat:      Pharynx: No oropharyngeal exudate or posterior oropharyngeal erythema.   Eyes:      General:         Right eye: No discharge.         Left eye: No discharge.      Conjunctiva/sclera: Conjunctivae normal.   Cardiovascular:      Rate and Rhythm: Normal rate and regular rhythm.   Pulmonary:      Effort: Pulmonary effort is normal. No respiratory distress.      Breath sounds: Normal breath sounds. No wheezing.   Abdominal:      General: Bowel sounds are normal. There is no distension.      Palpations: Abdomen is soft.      Tenderness: There is no abdominal tenderness.   Musculoskeletal:         General: Normal range of motion.      Cervical back: Normal range of motion and neck supple.   Lymphadenopathy:      Cervical: No cervical adenopathy.   Skin:     General: Skin is warm and dry.      Capillary Refill: Capillary refill takes less than 2 seconds.      Findings: No rash.   Neurological:      General: No focal deficit present.      Mental Status: She is alert and oriented to person, place, and time.   Psychiatric:         Mood and Affect: Mood normal.         Behavior: " Behavior normal.       MT Junior

## 2023-12-22 NOTE — PATIENT INSTRUCTIONS
Hipertensión   CUIDADO AMBULATORIO:   Hipertensión es la presión arterial lissette. La presión arterial es la fuerza que ejerce la blanche al moverse contra las saunders de las arterias. La hipertensión causa que cross presión arterial se eleve tanto que cross corazón se ve forzado a trabajar mucho más jo ann que lo normal. Theresa puede dañar cross corazón. La hipertensión que no responde a los medicamentos ni a los cambios en el estilo de kehinde se denomina hipertensión resistente. La hipertensión se considera crónica cuando se prolonga chandra 3 meses o más.  Signos y síntomas de hipertensión: Es posible que no presente ningún signo ni síntoma o que tenga alguno de los siguientes:  Dolor de maria luz    Visión borrosa    Dolor de pecho    Mareos o debilidad    Dificultad para respirar    Hemorragias nasales (sangrado de la nariz)    Llame al número local de emergencias (911 en [place]) o pídale a alguien que llame si:  Usted tiene dolor en el pecho.    Tiene alguno de los siguientes signos de un ataque cardíaco:      Estrujamiento, presión o tensión en cross pecho    Usted también podría presentar alguno de los siguientes:     Malestar o dolor en cross espalda, loida, mandíbula, abdomen, o brazo    Falta de aliento    Náuseas o vómitos    Desvanecimiento o sudor frío repentino    Usted se siente confundido o tiene dificultad para hablar.    Repentinamente se siente aturdido o con dificultad para respirar.    Busque atención médica de inmediato si:  Usted tiene un jo ann dolor de maria luz o pérdida de la visión.    Usted tiene debilidad en un brazo o en nathaniel pierna.    Llame a cross médico o cardiólogo si:  Usted se siente mareado, confundido, somnoliento o echo si se fuera a desmayar.    Usted ha estado tomando medicamento para la presión arterial akash todavía está en un nivel más elevado del que cross médico le indicó que debería estar.    Usted tiene preguntas o inquietudes acerca de cross condición o cuidado.    Lo que necesita saber  sobre las etapas de la hipertensión: Cross médico le indicará un objetivo de presión arterial en función de cross edad, cross estado de wolf y cross riesgo de padecer nathaniel enfermedad cardiovascular. Las siguientes son directrices generales sobre las etapas de la hipertensión:  Nathaniel presión arterial normal es 119/79 o inferior . Cross médico podría comprobar cross presión arterial solamente cada año si se mantiene en un nivel normal.    Nathaniel presión arterial elevada es de 120/79 a 129/79 . A veces esto se llama prehipertensión. Cross médico puede sugerir cambios de estilo de kehinde para ayudar a bajar la presión arterial a un nivel normal. Entonces él podría comprobar cross presión otra vez en 3 a 6 meses.    La etapa 1 de la hipertensión es de 130/80  a 139/89 . Cross médico podría recomendar cambios de estilo de kehinde, medicamentos y controles cada 3 a 6 meses hasta que cross presión arterial esté controlada.    La etapa 2 de la hipertensión es de 140/90 o mayor . Rcoss médico le recomendará cambios en el estilo de kehinde y le indicará 2 clases de medicamentos para la hipertensión. También necesitará controlar cross presión arterial cada mes hasta que esté controlada.       El tratamiento podría incluir cualquiera de los siguientes:  Los antihipertensivos podrían usarse para ayudar a disminuir la presión arterial. Varios tipos de medicamentos están disponibles. Cross médico elegirá medicamentos basados en el tipo de hipertensión que tiene. Es posible que necesite más de un tipo de medicamento. Sesser el medicamento exactamente echo indicado.    Los diuréticos ayudan a eliminar el exceso de líquido que se acumula en el organismo. Valentine ayudará a bajar cross presión arterial. Es posible que orine más seguido mientras roro laurent medicamento.    Los medicamentos para el colesterol ayudan a bajar los niveles de colesterol. Un nivel bajo de colesterol ayuda a prevenir enfermedades cardíacas y facilita el control de la presión arterial.    Maneje cross hipertensión:  Controle  cross presión arterial en casa. No fume, no consuma cafeína ni gaviota ejercicio al menos 30 minutos antes de controlar cross presión arterial. Siéntese y descanse por 5 minutos antes de tomarse la presión arterial. Extienda cross brazo y apóyelo en nathaniel superficie plana. Cross brazo debe estar a la misma altura que cross corazón. Siga las instrucciones que vienen con el monitor para la presión arterial. Controle cross presión arterial 2 veces, con diferencia de 1 minuto, antes de tessa cross medicamento por la mañana. También controle cross presión arterial antes de la howie. Mantenga un registro de cross peso y llévelo con usted a las citas de control. Pregúntele a cross médico cuál debería ser cross presión arterial.         Controle cualquier otra condición médica que usted tenga. Algunas condiciones médicas echo la diabetes pueden aumentar cross riesgo de hipertensión. Siga las instrucciones de cross médico y tómese kevin medicamentos según dichas instrucciones.    Pregunte sobre los medicamentos. Ciertos medicamentos pueden aumentar cross presión arterial. Los ejemplos incluyen las píldoras anticonceptivas orales, los descongestivos, los suplementos herbales y los ANNA, echo el ibuprofeno. Cross médico puede indicarle qué medicamentos son seguros para usted. Estos medicamentos incluyen los recetados y de venta daniel.    Cambios de estilo de kehinde que usted puede hacer para manejar la hipertensión: Cross médico puede recomendarle que trabaje con un equipo para controlar la hipertensión. El equipo puede incluir expertos médicos, echo un dietista, un fisioterapeuta o un terapeuta del comportamiento. Los miembros de cross jose maria pueden participar en la creación de cambios en el estilo de kehinde.     Limite el sodio (la sal) echo se le haya indicado. Demasiado sodio puede afectar el equilibrio de líquidos. Revise las etiquetas para buscar alimentos bajos en sodio o sin sal agregada. Algunos alimentos bajos en sodio utilizan sales de potasio para añadir sabor. Demasiado  potasio también puede causar problemas de wolf. Cross médico le dirá qué cantidad de sodio y potasio es fields para el consumo en un día. Puede recomendarle que limite el sodio a 2,300 mg al día.         Siga el plan de comidas recomendado por cross médico. Un dietista o médico puede darle más información sobre planes de bajo contenido de sodio o el plan de alimentación DASH (enfoques dietéticos para detener la hipertensión). El plan DASH es bajo en sodio, azúcar procesada, grasas dañinas y grasas totales. Es alto en potasio, calcio y fibra. Estos se encuentran en las verduras, las frutas y los alimentos integrales.         Manténgase físicamente activo chandra todo el día. La actividad física, echo el ejercicio, puede ayudar a controlar cross presión arterial y cross peso. Duglas actividad física por lo menos 30 minutos al día, la mayoría de los días de la semana. Incluya nathaniel actividad aeróbica, echo caminar o montar en bicicleta. Incluya también entrenamiento de fuerza al menos 2 veces por semana. Kevin médicos pueden ayudarle a crear un plan de actividad física.            Disminuya el estrés. Es posible que esto contribuya a bajar cross presión arterial. Aprenda sobre formas de relajarse, echo respiración profunda o escuchar música.    Limite el consumo de alcohol según le indicaron. El alcohol puede aumentar la presión arterial. Un trago equivale a 12 onzas de cerveza, 5 onzas de vino o 1 onza y ½ de licor.    No fume. La nicotina y otros químicos en los cigarrillos y cigarros pueden aumentar cross presión arterial y también provocar daño al pulmón. Pida información a cross médico si usted actualmente fuma y necesita ayuda para dejar de fumar. Los cigarrillos electrónicos o el tabaco sin humo igualmente contienen nicotina. Consulte con cross médico antes de utilizar estos productos.       Acuda a kevin consultas de control con cross médico o cardiólogo según le indicaron: Usted necesitará regresar para medir cross presión arterial y realizar  otros exámenes de laboratorio. Anote kevin preguntas para que se acuerde de hacerlas chandra kevin visitas.  © Copyright Merative 2023 Information is for End User's use only and may not be sold, redistributed or otherwise used for commercial purposes.  Esta información es sólo para uso en educación. Cross intención no es darle un consejo médico sobre enfermedades o tratamientos. Colsulte con cross médico, enfermera o farmacéutico antes de seguir cualquier régimen médico para saber si es seguro y efectivo para usted.  Planificación alimenticia con el método del plato   CUIDADO AMBULATORIO:   La planificación de las comidas con el método del plato es nathaniel manera de planificar los alimentos para las personas con diabetes. El plato puede ayudar a comer la cantidad correcta de carbohidratos y mantener los niveles de azúcar en la blanche bajo control. Los carbohidratos elevan naturalmente los niveles de azúcar en la blanche. Cross nivel de azúcar en la blanche puede subir demasiado alto si usted come muchos carbohidratos al mismo tiempo. Los carbohidratos se encuentran en los almidones (pan, cereal, verduras con almidón y frijoles), fruta, leche, yogur y dulces.  Cómo utilizar el método del plato para planear los alimentos:  Dibuje nathaniel línea imaginaria en medio de un plato de comida de 9 pulgadas. En un lado, dibuje otra línea para dividir yazan sección a la mitad. El plato tendrá 3 secciones.    Llene la sección más caio del plato con verduras sin almidón. Estos incluyen al brócoli, espinacas, pepino, pimientos, coliflor y tomates.    Agregue un carbohidrato a 1 sección pequeña del plato. Los ejemplos son pasta, arroz, panes de raquel entero, tortillas, maíz, mario y frijoles. Cross plan puede permitir nathaniel ración de lácteos bajos en grasa o fruta echo carbohidrato.    Agregue carne u otra acacia de proteína a la otra sección pequeña de cross plato. Por ejemplo, maryellen o pavo sin piel, pescado, carne de res o de puerco, queso bajo en grasa, tofu  o huevos.         Añada nathaniel bebida baja en calorías o sin calorías. Por ejemplo, agua o café o té sin azúcar.       Tamaño de las porciones de los alimentos:  Verduras sin almidón:     ½ de taza de verdura cocida o 1 taza de verdura cruda    ½ taza de jugo de verduras    Almidones:     1 onza de pan de raquel entero o 1 tortilla pequeña (6 pulgadas) de harina o maíz    1 panqué (4 pulgadas) pequeño (de aproximadamente ¼ de pulgada de grosor)    ¾ de taza de cereal seco, sin endulzar, de grano entero y listo para comer, o ¼ de taza de granola baja en grasa    ½ taza de cereal o alexsander cocidos    ? de taza de arroz o pasta    ½ de taza de maíz, chícharos verdes, camote o puré de papa    ½ taza de frijoles y legumbres (garbanzo, frijol chapa, tipo riñón, arita, partido, tala) cocidos    Carne y otras bucio de proteínas:     De 3 a 4 onzas de cualquier carne magra, pescado o carne de ave    ½ taza de tofu o tempeh    1 huevo caio    1½ onzas (alrededor de 2 cucharadas) de nueces o 2 cucharadas de crema de cacahuate    Frutas:     1 pedazo pequeño de fruta fresca    ½ taza de fruta enlatada o fresca, o jugo de fruta sin azúcar    ¼ de taza de fruta seca    Leche y yogur:     1 taza (8 onzas) de leche sin grasa o del 1%    ¾ de taza (6 onzas) de yogur natural y sin grasa    Otros consejos de nutrición saludable:  Limite el consumo de sal y azúcar. Seleccione y prepare alimentos y bebidas con menos sal y azúcares agregadas. Utilice la información nutricional en las etiquetas de los alimentos para ayudarle a hacer nathaniel elección más saludable. El valor del porcentaje diario escrito en las etiquetas de los alimentos, le informa si un alimento es bajo o alto en ciertos nutrientes. Un valor de 5% o menos en el porcentaje diario, significa que el alimento es bajo en el nutriente. Un valor de 20% o más en el porcentaje diario, significa que el alimento es alto en un nutriente.         Seleccione grasas saludables. Annie bahena  saludables echo la poliinsaturada y la monoinsaturada en lugar de las grasas malas para la wolf. Las grasas saludables se encuentran en los aceites vegetales tales echo el frijol de soya, maíz, canola, brito y girasol. Las grasas que no son saludables son las grasas saturadas, las grasas transgénicas y el colesterol. Las grasas no saludables se encuentran en la manteca, la mantequilla, la margarina de consuelo y la grasa animal.         Pregúntele a cross médico si usted puede tessa alcohol. En general, los hombres de 65 años o más y las mujeres deben limitar el consumo de alcohol a 1 bebida en 24 horas y a 7 en 1 semana. Los hombres de 21 a 64 años deberían limitar el consumo de alcohol a 2 tragos al día y a 14 en 1 semana. Cross médico puede decirle cuántas bebidas puede tessa en 24 horas o en 1 semana. Un trago equivale a 12 onzas de cerveza, 5 onzas de vino o 1 onza y ½ de licor. Siempre que cinda alcohol, acompáñelo con alimentos. Cross nivel de azúcar en blanche puede descender a un nivel bajo si russell cuando cross estómago está vacío.    © Copyright Merative 2023 Information is for End User's use only and may not be sold, redistributed or otherwise used for commercial purposes.  Esta información es sólo para uso en educación. Cross intención no es darle un consejo médico sobre enfermedades o tratamientos. Colsulte con cross médico, enfermera o farmacéutico antes de seguir cualquier régimen médico para saber si es seguro y efectivo para usted.

## 2023-12-22 NOTE — ASSESSMENT & PLAN NOTE
BP Readings from Last 3 Encounters:   12/22/23 140/98   07/26/23 146/82   06/12/23 (!) 162/104      Recommend increasing losartan to 100 mg daily   Patient does not wish to increase medication, discussed importance of BP being within goal, continue at 50 mg daily at this time   Discussed lifestyle and diet interventions   Recommend home BP monitoring and bring record to follow up with PCP for review

## 2024-02-05 PROBLEM — Z12.31 BREAST CANCER SCREENING BY MAMMOGRAM: Status: ACTIVE | Noted: 2024-02-05

## 2024-02-05 PROBLEM — R21 RASH OF MOUTH PRESENT ON EXAMINATION: Status: RESOLVED | Noted: 2022-11-18 | Resolved: 2024-02-05

## 2024-02-21 PROBLEM — Z01.419 ENCOUNTER FOR GYNECOLOGICAL EXAMINATION WITHOUT ABNORMAL FINDING: Status: RESOLVED | Noted: 2022-10-13 | Resolved: 2024-02-21

## 2024-03-20 DIAGNOSIS — I10 PRIMARY HYPERTENSION: ICD-10-CM

## 2024-03-20 RX ORDER — LOSARTAN POTASSIUM 50 MG/1
50 TABLET ORAL DAILY
Qty: 90 TABLET | Refills: 0 | Status: SHIPPED | OUTPATIENT
Start: 2024-03-20

## 2024-03-20 NOTE — PROGRESS NOTES
ADULT ANNUAL PHYSICAL  [facility]    NAME: Elizabet Martinez  AGE: 58 y.o. SEX: female  : 1965     DATE: 3/21/2024     Assessment and Plan:     Problem List Items Addressed This Visit       Hypertension     Uncontrolled   BP today: 150/90 mmHg (rechecked)  Currently on Losartan 50 mg qd, no compliance issues    Plan:  Add HCTZ 25 mg qd   BP diary  Encouraged lifestyle modifications   Follow up in 1 month          Relevant Medications    hydroCHLOROthiazide 25 mg tablet    Blood Pressure KIT     Other Visit Diagnoses       Annual physical exam    -  Primary            Immunizations and preventive care screenings were discussed with patient today. Appropriate education was printed on patient's after visit summary.    Counseling:  Alcohol/drug use: discussed moderation in alcohol intake, the recommendations for healthy alcohol use, and avoidance of illicit drug use.  Dental Health: discussed importance of regular tooth brushing, flossing, and dental visits.  Exercise: the importance of regular exercise/physical activity was discussed. Recommend exercise 3-5 times per week for at least 30 minutes.     BMI Counseling: Body mass index is 30.76 kg/m². The BMI is above normal. Nutrition recommendations include decreasing portion sizes, consuming healthier snacks and reducing intake of saturated and trans fat. Exercise recommendations include exercising 3-5 times per week. Rationale for BMI follow-up plan is due to patient being overweight or obese.     Depression Screening and Follow-up Plan: Patient's depression screening was positive with a PHQ-2 score of 4.         Return in about 4 weeks (around 2024) for follow up on HTN and pap smear with her PCP (Dr. Gong) .     Chief Complaint:     Chief Complaint   Patient presents with    Annual Exam     Pt also states lower back pain       History of Present Illness:     Adult Annual Physical    Patient here for a comprehensive physical exam.     Diet and Physical Activity  Diet/Nutrition: well balanced diet. reported  Exercise: no formal exercise. Patient reports to work as at [employer] and reports to lift heavy weight daily     Depression Screening  PHQ-2/9 Depression Screening    Little interest or pleasure in doing things: 2 - more than half the days  Feeling down, depressed, or hopeless: 2 - more than half the days  PHQ-2 Score: 4  PHQ-2 Interpretation: POSITIVE depression screen       General Health  Sleep: gets 4-6 hours of sleep on average.   Hearing: normal - bilateral.  Vision: no vision problems and most recent eye exam >1 year ago.   Dental: regular dental visits, brushes teeth twice daily, and does not floss.     Colonoscopy done on 3/2023; to be repeated in 5 years due to hx of polyps (patient made aware)    /GYN Health  Follows with gynecology? no   Patient is: postmenopausal  Last menstrual period: 4 years ago  Contraceptive method:  Patient not sexually active .  Last pap in 2022 was positive for HPV, patient is due for one. Will schedule a visit for pap smear  Mammogram ordered on 12/2023 (patient made aware)    Immunization:  Patient due for pneumococcal vaccine: Deferred at this time. Revisit immunization next visit.    Advanced Care Planning  Do you have an advanced directive? no  Do you have a durable medical power of ? no  ACP document given to the patient? no     Review of Systems:     Review of Systems   Eyes:  Negative for visual disturbance.   Respiratory:  Negative for shortness of breath.    Cardiovascular:  Negative for chest pain, palpitations and leg swelling.      Past Medical History:     Past Medical History:   Diagnosis Date    Hypertension       Past Surgical History:     No past surgical history on file.   Social History:     Social History     Socioeconomic History    Marital status: Single     Spouse name: None    Number of children: None    Years of  education: None    Highest education level: None   Occupational History    None   Tobacco Use    Smoking status: Never     Passive exposure: Never    Smokeless tobacco: Never   Vaping Use    Vaping status: Never Used   Substance and Sexual Activity    Alcohol use: Never    Drug use: Never    Sexual activity: None   Other Topics Concern    None   Social History Narrative    None     Social Determinants of Health     Financial Resource Strain: Low Risk  (3/21/2024)    Overall Financial Resource Strain (CARDIA)     Difficulty of Paying Living Expenses: Not very hard   Food Insecurity: No Food Insecurity (3/21/2024)    Hunger Vital Sign     Worried About Running Out of Food in the Last Year: Never true     Ran Out of Food in the Last Year: Never true   Transportation Needs: No Transportation Needs (3/21/2024)    PRAPARE - Transportation     Lack of Transportation (Medical): No     Lack of Transportation (Non-Medical): No   Physical Activity: Unknown (3/6/2020)    Received from [facility]    Exercise Vital Sign     Days of Exercise per Week: Patient refused     Minutes of Exercise per Session: Patient refused   Stress: No Stress Concern Present (3/6/2020)    Received from [facility]    [place] [employer] - Occupational Stress Questionnaire     Feeling of Stress : Not at all   Social Connections: Not on file   Intimate Partner Violence: Unknown (3/6/2020)    Received from [facility]    Humiliation, Afraid, Rape, and Kick questionnaire     Fear of Current or Ex-Partner: Patient declined     Emotionally Abused: Patient declined     Physically Abused: Patient declined     Sexually Abused: Patient declined   Housing Stability: High Risk (3/21/2024)    Housing Stability Vital Sign     Unable to Pay for Housing in the Last Year: Yes     Number of Places Lived in the Last Year: 3     Unstable Housing in the Last Year: No      Family History:     Family  "History   Problem Relation Age of Onset    No Known Problems Mother     No Known Problems Maternal Grandmother     No Known Problems Maternal Grandfather     No Known Problems Paternal Grandmother     No Known Problems Paternal Grandfather     No Known Problems Maternal Aunt     No Known Problems Maternal Aunt     No Known Problems Maternal Aunt     No Known Problems Maternal Aunt     No Known Problems Maternal Aunt     No Known Problems Paternal Aunt     No Known Problems Paternal Aunt       Current Medications:     Current Outpatient Medications   Medication Sig Dispense Refill    Blood Pressure KIT Use in the morning 1 kit 0    hydroCHLOROthiazide 25 mg tablet Take 1 tablet (25 mg total) by mouth daily 90 tablet 0    albuterol (Ventolin HFA) 90 mcg/act inhaler Inhale 2 puffs every 6 (six) hours as needed for wheezing 18 g 5    cholecalciferol (VITAMIN D3) 1,000 units tablet Take 2 tablets (2,000 Units total) by mouth daily Do not start before June 5, 2023. 60 tablet 2    diphenhydrAMINE (BENADRYL) 2 % cream Apply topically 3 (three) times a day as needed for itching 30 g 0    ergocalciferol (VITAMIN D2) 50,000 units Take 1 capsule (50,000 Units total) by mouth once a week for 12 doses 12 capsule 0    fluticasone (FLONASE) 50 mcg/act nasal spray SHAKE LIQUID AND USE 1 SPRAY IN EACH NOSTRIL DAILY 16 g 1    fluticasone (Flovent HFA) 44 mcg/act inhaler Inhale 2 puffs 2 (two) times a day Rinse mouth after use. 10.6 g 1    loratadine (CLARITIN) 10 mg tablet Take 1 tablet (10 mg total) by mouth daily 30 tablet 1    losartan (COZAAR) 50 mg tablet TAKE 1 TABLET(50 MG) BY MOUTH DAILY 90 tablet 0     No current facility-administered medications for this visit.      Allergies:     Allergies   Allergen Reactions    Pineapple - Food Allergy Lip Swelling    Shrimp (Diagnostic) - Food Allergy Swelling      Physical Exam:     /90   Pulse 81   Temp 97.7 °F (36.5 °C) (Temporal)   Resp 18   Ht 5' 6\" (1.676 m)   Wt 86.5 kg " (190 lb 9.6 oz)   SpO2 99%   BMI 30.76 kg/m²     Physical Exam  Constitutional:       General: She is not in acute distress.     Appearance: She is not ill-appearing or toxic-appearing.   HENT:      Head: Normocephalic.      Right Ear: External ear normal.      Left Ear: External ear normal.      Nose: Nose normal.   Eyes:      General: No scleral icterus.     Conjunctiva/sclera: Conjunctivae normal.   Cardiovascular:      Rate and Rhythm: Normal rate and regular rhythm.      Heart sounds: No murmur heard.     No gallop.   Pulmonary:      Effort: Pulmonary effort is normal. No respiratory distress.      Breath sounds: No wheezing or rhonchi.   Skin:     General: Skin is warm and dry.      Capillary Refill: Capillary refill takes less than 2 seconds.   Neurological:      Mental Status: She is alert.          Osorio Coleman MD  [facility]

## 2024-03-21 ENCOUNTER — OFFICE VISIT (OUTPATIENT)
Dept: FAMILY MEDICINE CLINIC | Facility: CLINIC | Age: 59
End: 2024-03-21

## 2024-03-21 VITALS
HEART RATE: 81 BPM | TEMPERATURE: 97.7 F | OXYGEN SATURATION: 99 % | BODY MASS INDEX: 30.63 KG/M2 | DIASTOLIC BLOOD PRESSURE: 90 MMHG | HEIGHT: 66 IN | SYSTOLIC BLOOD PRESSURE: 150 MMHG | RESPIRATION RATE: 18 BRPM | WEIGHT: 190.6 LBS

## 2024-03-21 DIAGNOSIS — Z00.00 ANNUAL PHYSICAL EXAM: Primary | ICD-10-CM

## 2024-03-21 DIAGNOSIS — I10 PRIMARY HYPERTENSION: ICD-10-CM

## 2024-03-21 PROCEDURE — 99396 PREV VISIT EST AGE 40-64: CPT | Performed by: STUDENT IN AN ORGANIZED HEALTH CARE EDUCATION/TRAINING PROGRAM

## 2024-03-21 RX ORDER — HYDROCHLOROTHIAZIDE 25 MG/1
25 TABLET ORAL DAILY
Qty: 90 TABLET | Refills: 0 | Status: SHIPPED | OUTPATIENT
Start: 2024-03-21 | End: 2024-06-19

## 2024-03-21 RX ORDER — BLOOD PRESSURE TEST KIT
KIT MISCELLANEOUS DAILY
Qty: 1 KIT | Refills: 0 | Status: SHIPPED | OUTPATIENT
Start: 2024-03-21

## 2024-03-21 NOTE — ASSESSMENT & PLAN NOTE
Uncontrolled   BP today: 150/90 mmHg (rechecked)  Currently on Losartan 50 mg qd, no compliance issues    Plan:  Add HCTZ 25 mg qd   BP diary  Encouraged lifestyle modifications   Follow up in 1 month

## 2024-04-04 ENCOUNTER — TELEPHONE (OUTPATIENT)
Dept: FAMILY MEDICINE CLINIC | Facility: CLINIC | Age: 59
End: 2024-04-04

## 2024-04-04 NOTE — TELEPHONE ENCOUNTER
Patient need refill of hypertension medication but she got prescribe two and she do not know if she has to take both or just one     Please send refill and Call her daughter at [phone] for explanation       hydroCHLOROthiazide 25 mg tablet       losartan (COZAAR) 50 mg tablet

## 2024-04-04 NOTE — TELEPHONE ENCOUNTER
Good faina Bazan,     Please inform patient's daughter that patient should be taking both medications. She was previously only on Losartan 50 mg, however her blood pressure was not well controlled so hydrochlorothiazide 25 mg was added to optimize her blood pressure control. She can take both pills in the morning.     Please remind patient that she has an appointment with her PCP, Dr. Gong, on 4/23/24.    Thanks

## 2024-04-22 PROBLEM — Z13.31 DEPRESSION SCREENING: Status: ACTIVE | Noted: 2024-04-22

## 2024-04-23 ENCOUNTER — TELEPHONE (OUTPATIENT)
Dept: FAMILY MEDICINE CLINIC | Facility: CLINIC | Age: 59
End: 2024-04-23

## 2024-04-23 NOTE — TELEPHONE ENCOUNTER
04/23/24    CALLED PT     NO ANSWER    LEFT MESSAGE     APPT CANCELED    PCP NOT AVAILABLE      IF PT CONTACT OFFICE, PLEASE ASSISTS PT WITH RENU 04/23/24 APPT       NOTE: LETTER SENT

## 2024-05-11 NOTE — PROGRESS NOTES
Assessment/Plan:    Hypertension  Blood pressure controlled  Home meds: HCTZ 25 mg QD, and losartan 50 mg QD     Plan:   Diet and exercise counseling   Continue medications   Blood pressure measurement daily   encourage patient to FU with previosuly ordered labs ( CMP)     Pap smear for cervical cancer screening  asymptomatic    Plan: liquid pap smear and HPV co testing     Breast cancer screening by mammogram  Last mammogram normal   Asymptomatic     Plan:  Provided central scheduling to patient to call to make an appointment     Vitamin D deficiency  Last vitamin D 17 2024     Plan: recheck vitamin D      Diagnoses and all orders for this visit:    Depression screening    Primary hypertension  -     hydroCHLOROthiazide 25 mg tablet; Take 1 tablet (25 mg total) by mouth daily  -     losartan (COZAAR) 50 mg tablet; Take 1 tablet (50 mg total) by mouth daily    Pap smear for cervical cancer screening  -     Liquid-based pap, screening  -     HPV High Risk    Breast cancer screening by mammogram    Vitamin D deficiency          Subjective:      Patient ID: Elizabet Martinez is a 59 y.o. female.    HPI  Patient is a 59 y.o. year old female presenting today for gyn examination.     Patient has no current gynecology complaints.     Menstrual cycle: menopausal ( early 50s)   LMP: more than 1 year   Patient has no abnormal vaginal bleeding.   Urinary symptoms: none  Breast lumps or abnormalities: none    Pregnancy history:   Current Contraceptive Method: none   Last mammogram: 2022  Last pap smear: 2024 negative for intraepithelial lesion or malignancy   Any abnormal pap smears: no , previous positive HPV ( )  HPV Vaccine: none  Family hx: no hx of breast cancer and gyn cancer none  Sexually active: none    Current partner: none  Previous STI: none   Safety: Currently patient feels safe  and at home. Yes, partner in [facility]     PLAN:   - GYN exam completed with liquid pap smear screening   -  "bimanual exam         The following portions of the patient's history were reviewed and updated as appropriate: allergies, current medications, past family history, past medical history, past social history, past surgical history, and problem list.    Review of Systems   Respiratory:  Negative for cough, chest tightness, shortness of breath and wheezing.    Cardiovascular:  Negative for chest pain.   Gastrointestinal:  Negative for abdominal pain, blood in stool, nausea and vomiting.   Genitourinary:  Negative for dysuria, hematuria and vaginal bleeding.   Neurological:  Negative for dizziness and light-headedness.         Objective:      /96 (BP Location: Left arm, Patient Position: Sitting, Cuff Size: Standard)   Pulse 76   Temp 98.4 °F (36.9 °C) (Temporal)   Resp 18   Ht 5' 6\" (1.676 m)   SpO2 99%   BMI 30.76 kg/m²          Physical Exam  Exam conducted with a chaperone present.   Constitutional:       General: She is not in acute distress.     Appearance: She is obese. She is not ill-appearing or toxic-appearing.   HENT:      Head: Normocephalic and atraumatic.      Right Ear: External ear normal.      Left Ear: External ear normal.      Mouth/Throat:      Mouth: Mucous membranes are moist.   Eyes:      Conjunctiva/sclera: Conjunctivae normal.   Cardiovascular:      Rate and Rhythm: Normal rate and regular rhythm.      Pulses: Normal pulses.   Abdominal:      General: Abdomen is flat. There is no distension.      Palpations: Abdomen is soft.      Tenderness: There is no abdominal tenderness. There is no guarding.      Hernia: There is no hernia in the left inguinal area or right inguinal area.   Genitourinary:     General: Normal vulva.      Exam position: Lithotomy position.      Pubic Area: No rash.       Asad stage (genital): 5.      Labia:         Right: No rash or lesion.         Left: No rash or lesion.       Urethra: No prolapse, urethral swelling or urethral lesion.      Comments: No " lesions or areas or erythema on labia. No abnormal discharge.  Skin:     General: Skin is warm.      Capillary Refill: Capillary refill takes less than 2 seconds.   Neurological:      Mental Status: She is alert and oriented to person, place, and time.

## 2024-05-11 NOTE — ASSESSMENT & PLAN NOTE
Blood pressure controlled  Home meds: HCTZ 25 mg QD, and losartan 50 mg QD     Plan:   Diet and exercise counseling   Continue medications   encourage patient to FU with previosuly ordered labs ( CMP)   FU in 3 months

## 2024-05-14 ENCOUNTER — ANNUAL EXAM (OUTPATIENT)
Dept: FAMILY MEDICINE CLINIC | Facility: CLINIC | Age: 59
End: 2024-05-14

## 2024-05-14 VITALS
RESPIRATION RATE: 18 BRPM | SYSTOLIC BLOOD PRESSURE: 137 MMHG | HEART RATE: 76 BPM | TEMPERATURE: 98.4 F | BODY MASS INDEX: 30.76 KG/M2 | HEIGHT: 66 IN | DIASTOLIC BLOOD PRESSURE: 96 MMHG | OXYGEN SATURATION: 99 %

## 2024-05-14 DIAGNOSIS — Z12.4 PAP SMEAR FOR CERVICAL CANCER SCREENING: ICD-10-CM

## 2024-05-14 DIAGNOSIS — I10 PRIMARY HYPERTENSION: ICD-10-CM

## 2024-05-14 DIAGNOSIS — E55.9 VITAMIN D DEFICIENCY: ICD-10-CM

## 2024-05-14 DIAGNOSIS — Z12.31 BREAST CANCER SCREENING BY MAMMOGRAM: ICD-10-CM

## 2024-05-14 DIAGNOSIS — Z13.31 DEPRESSION SCREENING: Primary | ICD-10-CM

## 2024-05-14 PROCEDURE — 99214 OFFICE O/P EST MOD 30 MIN: CPT | Performed by: FAMILY MEDICINE

## 2024-05-14 PROCEDURE — 87624 HPV HI-RISK TYP POOLED RSLT: CPT

## 2024-05-14 PROCEDURE — G0145 SCR C/V CYTO,THINLAYER,RESCR: HCPCS

## 2024-05-14 RX ORDER — HYDROCHLOROTHIAZIDE 25 MG/1
25 TABLET ORAL DAILY
Qty: 90 TABLET | Refills: 0 | Status: SHIPPED | OUTPATIENT
Start: 2024-05-14 | End: 2024-08-12

## 2024-05-14 RX ORDER — LOSARTAN POTASSIUM 50 MG/1
50 TABLET ORAL DAILY
Qty: 90 TABLET | Refills: 0 | Status: SHIPPED | OUTPATIENT
Start: 2024-05-14

## 2024-05-14 NOTE — PATIENT INSTRUCTIONS
Do not wear any perfume, powder, lotion or deodorant on the breast or underarm area. If you have had any prior breast studies done at a different facility than [facility], please bring a copy of the study with you on the day of your test. Please allow at least 30 minutes for this appointment. Please bring your insurance cards, a form of photo ID and a list of your medications with you.    To schedule this appointment, please contact Central Scheduling at [phone] for your mammography.

## 2024-05-14 NOTE — ASSESSMENT & PLAN NOTE
Last mammogram normal   Asymptomatic     Plan:  Provided central scheduling to patient to call to make an appointment

## 2024-05-15 LAB
HPV HR 12 DNA CVX QL NAA+PROBE: NEGATIVE
HPV16 DNA CVX QL NAA+PROBE: NEGATIVE
HPV18 DNA CVX QL NAA+PROBE: NEGATIVE

## 2024-05-20 LAB
LAB AP GYN PRIMARY INTERPRETATION: NORMAL
Lab: NORMAL

## 2024-07-04 DIAGNOSIS — I10 PRIMARY HYPERTENSION: ICD-10-CM

## 2024-07-05 RX ORDER — LOSARTAN POTASSIUM 50 MG/1
50 TABLET ORAL DAILY
Qty: 90 TABLET | Refills: 0 | Status: SHIPPED | OUTPATIENT
Start: 2024-07-05

## 2024-07-05 RX ORDER — HYDROCHLOROTHIAZIDE 25 MG/1
25 TABLET ORAL DAILY
Qty: 90 TABLET | Refills: 0 | Status: SHIPPED | OUTPATIENT
Start: 2024-07-05

## 2024-08-16 ENCOUNTER — TELEPHONE (OUTPATIENT)
Dept: FAMILY MEDICINE CLINIC | Facility: CLINIC | Age: 59
End: 2024-08-16

## 2024-10-23 ENCOUNTER — TELEPHONE (OUTPATIENT)
Dept: FAMILY MEDICINE CLINIC | Facility: CLINIC | Age: 59
End: 2024-10-23

## 2024-11-06 ENCOUNTER — HOSPITAL ENCOUNTER (OUTPATIENT)
Dept: MAMMOGRAPHY | Facility: CLINIC | Age: 59
Discharge: HOME | End: 2024-11-06
Payer: COMMERCIAL

## 2024-11-06 VITALS — WEIGHT: 190 LBS | BODY MASS INDEX: 30.53 KG/M2 | HEIGHT: 66 IN

## 2024-11-06 DIAGNOSIS — Z12.31 BREAST CANCER SCREENING BY MAMMOGRAM: ICD-10-CM

## 2024-11-06 PROCEDURE — 77067 SCR MAMMO BI INCL CAD: CPT

## 2024-11-06 PROCEDURE — 77063 BREAST TOMOSYNTHESIS BI: CPT

## 2024-11-11 NOTE — ASSESSMENT & PLAN NOTE
BP Readings from Last 3 Encounters:   11/13/24 140/80   05/14/24 137/96   03/21/24 150/90       Blood pressure controlled  Home meds: HCTZ 25 mg QD, and losartan 50 mg QD   Symptomatic    Plan:   Diet and exercise counseling   Continue medications   encourage patient to FU with previosuly ordered labs ( CMP)   FU in 3 months

## 2024-11-11 NOTE — ASSESSMENT & PLAN NOTE
Last vitamin D 17 03/2023  Currently not taking any medications     Plan:   FU with previously ordered vitamin D

## 2024-11-11 NOTE — PROGRESS NOTES
Ambulatory Visit  Name: Elizabet Martinez      : 1965      MRN: [id number]  Encounter Provider: Ela Gong MD  Encounter Date: 2024   Encounter department: [facility]    Assessment & Plan  Primary hypertension    BP Readings from Last 3 Encounters:   24 140/80   24 137/96   24 150/90       Blood pressure controlled  Home meds: HCTZ 25 mg QD, and losartan 50 mg QD   Symptomatic    Plan:   Diet and exercise counseling   Continue medications   encourage patient to FU with previosuly ordered labs ( CMP)   FU in 3 months         Vitamin D deficiency  Last vitamin D 17 2023  Currently not taking any medications     Plan:   FU with previously ordered vitamin D         Prediabetes  Lab Results   Component Value Date    HGBA1C 6.0 2024     Last A1c: 5.6   No medications currently      Plan:   Diet and exercise counseling     Orders:    POCT hemoglobin A1c    Rash  Lips appear dry and cracked.  Has history of herpes labialis on history.  No active lesions on lip currently to suggest herpes outbreak.    May be secondary to acyclovir overuse, prior medication use, irritation from weather changes, lack of moisturization    Plan:  Discontinue  topical acyclovir for now, and check for expiration date  [place] Blistex use  Topical steroid use as needed, not to be used more than 1 week    Orders:    hydrocortisone 2.5 % cream       History of Present Illness     HPI  Elizabet Martinez 59 y.o. with past medical history is significant for hyptention, pre-DM and vitamin deficiency comes to the clinic to FU in HTN.     Has been having dry lips the past 2 weeks that are itching. She has has history of herpes labialis, for which he used acyclovir topically with good effect after a few days.  This time around she had the singular lesion however her lips have been dry and cracked for the past 2 weeks despite using the acyclovir daily.  Patient has history  "of herpes labialis that comes and go and occurs 1-2 times per year usually in the summer and in the fall/winter.      Review of Systems   Constitutional:  Negative for chills and fever.   HENT:  Negative for dental problem, facial swelling, rhinorrhea, sinus pressure, sore throat, trouble swallowing and voice change.    Respiratory:  Negative for cough, choking, chest tightness and shortness of breath.    Cardiovascular:  Negative for chest pain and palpitations.   Gastrointestinal:  Negative for abdominal pain and vomiting.   Genitourinary:  Negative for dysuria and hematuria.   Musculoskeletal:  Negative for arthralgias and back pain.   Skin:  Negative for color change and rash.   Neurological:  Negative for seizures and syncope.   Psychiatric/Behavioral:  Negative for agitation.    All other systems reviewed and are negative.          Objective     /80 (BP Location: Right arm, Patient Position: Sitting, Cuff Size: Large)   Pulse 80   Temp 97.8 °F (36.6 °C) (Temporal)   Resp 18   Ht 5' 6\" (1.676 m)   Wt 86.6 kg (191 lb)   SpO2 98%   Breastfeeding No   BMI 30.83 kg/m²     Physical Exam  Constitutional:       General: She is not in acute distress.     Appearance: Normal appearance. She is normal weight. She is not ill-appearing or toxic-appearing.   HENT:      Head: Normocephalic and atraumatic.      Right Ear: External ear normal.      Left Ear: External ear normal.      Nose: Nose normal.      Mouth/Throat:      Mouth: Mucous membranes are moist.      Pharynx: No oropharyngeal exudate or posterior oropharyngeal erythema.   Eyes:      General: No scleral icterus.     Extraocular Movements: Extraocular movements intact.      Conjunctiva/sclera: Conjunctivae normal.   Cardiovascular:      Rate and Rhythm: Normal rate and regular rhythm.      Pulses: Normal pulses.      Heart sounds: Normal heart sounds.   Pulmonary:      Effort: Pulmonary effort is normal.      Breath sounds: Normal breath sounds. "   Abdominal:      General: Bowel sounds are normal.      Palpations: Abdomen is soft.      Tenderness: There is no abdominal tenderness.   Musculoskeletal:         General: Normal range of motion.   Skin:     General: Skin is warm.      Capillary Refill: Capillary refill takes less than 2 seconds.      Comments: Dry cracked upper lip, no active bleeding.  No vesicular lesion.   Neurological:      General: No focal deficit present.      Mental Status: She is alert and oriented to person, place, and time.   Psychiatric:         Mood and Affect: Mood normal.         Behavior: Behavior normal.

## 2024-11-11 NOTE — ASSESSMENT & PLAN NOTE
Lab Results   Component Value Date    HGBA1C 6.0 11/13/2024     Last A1c: 5.6   No medications currently      Plan:   Diet and exercise counseling     Orders:    POCT hemoglobin A1c

## 2024-11-13 ENCOUNTER — OFFICE VISIT (OUTPATIENT)
Dept: FAMILY MEDICINE CLINIC | Facility: CLINIC | Age: 59
End: 2024-11-13

## 2024-11-13 VITALS
OXYGEN SATURATION: 98 % | RESPIRATION RATE: 18 BRPM | SYSTOLIC BLOOD PRESSURE: 140 MMHG | WEIGHT: 191 LBS | BODY MASS INDEX: 30.7 KG/M2 | DIASTOLIC BLOOD PRESSURE: 80 MMHG | TEMPERATURE: 97.8 F | HEART RATE: 80 BPM | HEIGHT: 66 IN

## 2024-11-13 DIAGNOSIS — R21 RASH: ICD-10-CM

## 2024-11-13 DIAGNOSIS — I10 PRIMARY HYPERTENSION: Primary | ICD-10-CM

## 2024-11-13 DIAGNOSIS — R73.03 PREDIABETES: ICD-10-CM

## 2024-11-13 DIAGNOSIS — E55.9 VITAMIN D DEFICIENCY: ICD-10-CM

## 2024-11-13 LAB — SL AMB POCT HEMOGLOBIN AIC: 6 (ref ?–6.5)

## 2024-11-13 PROCEDURE — 83036 HEMOGLOBIN GLYCOSYLATED A1C: CPT | Performed by: FAMILY MEDICINE

## 2024-11-13 PROCEDURE — 99213 OFFICE O/P EST LOW 20 MIN: CPT | Performed by: FAMILY MEDICINE

## 2024-11-13 RX ORDER — HYDROCORTISONE 25 MG/G
CREAM TOPICAL 2 TIMES DAILY
Status: DISCONTINUED | OUTPATIENT
Start: 2024-11-13 | End: 2024-11-13

## 2024-11-13 RX ORDER — HYDROCORTISONE 25 MG/G
OINTMENT TOPICAL 2 TIMES DAILY
Qty: 20 G | Refills: 0 | Status: SHIPPED | OUTPATIENT
Start: 2024-11-13

## 2024-11-14 NOTE — ASSESSMENT & PLAN NOTE
Lips appear dry and cracked.  Has history of herpes labialis on history.  No active lesions on lip currently to suggest herpes outbreak.    May be secondary to acyclovir overuse, prior medication use, irritation from weather changes, lack of moisturization    Plan:  Discontinue  topical acyclovir for now, and check for expiration date  [place] Blistex use  Topical steroid use as needed, not to be used more than 1 week    Orders:    hydrocortisone 2.5 % cream

## 2024-12-18 DIAGNOSIS — I10 PRIMARY HYPERTENSION: ICD-10-CM

## 2024-12-19 ENCOUNTER — TELEPHONE (OUTPATIENT)
Dept: URGENT CARE | Age: 59
End: 2024-12-19

## 2024-12-19 DIAGNOSIS — R73.03 PREDIABETES: ICD-10-CM

## 2024-12-19 DIAGNOSIS — I10 PRIMARY HYPERTENSION: Primary | ICD-10-CM

## 2024-12-19 RX ORDER — LOSARTAN POTASSIUM 50 MG/1
50 TABLET ORAL DAILY
Qty: 30 TABLET | Refills: 0 | Status: SHIPPED | OUTPATIENT
Start: 2024-12-19

## 2024-12-19 RX ORDER — HYDROCHLOROTHIAZIDE 25 MG/1
25 TABLET ORAL DAILY
Qty: 30 TABLET | Refills: 0 | Status: SHIPPED | OUTPATIENT
Start: 2024-12-19

## 2024-12-19 NOTE — TELEPHONE ENCOUNTER
Please let patient know she has pending lab orders that need to be completed. I have put refills for her BP medsfor 1 month but will need these labs before signing for any further refills thanks. She is due for a check up with me in about 1 month. When calling, please have her schedule an appt with me in about 1 month.     Thank you!

## 2025-01-15 DIAGNOSIS — I10 PRIMARY HYPERTENSION: ICD-10-CM

## 2025-01-19 DIAGNOSIS — I10 PRIMARY HYPERTENSION: Primary | ICD-10-CM

## 2025-01-19 DIAGNOSIS — E55.9 VITAMIN D DEFICIENCY: ICD-10-CM

## 2025-01-19 RX ORDER — HYDROCHLOROTHIAZIDE 25 MG/1
25 TABLET ORAL DAILY
Qty: 30 TABLET | Refills: 0 | Status: SHIPPED | OUTPATIENT
Start: 2025-01-19

## 2025-01-19 RX ORDER — LOSARTAN POTASSIUM 50 MG/1
50 TABLET ORAL DAILY
Qty: 30 TABLET | Refills: 0 | Status: SHIPPED | OUTPATIENT
Start: 2025-01-19

## 2025-01-19 NOTE — TELEPHONE ENCOUNTER
Hello  Please let this patient know I have ordered labs for her to get done at her earliest convenience. I have provided 1 month of refills and will need to have the labs completed before further refills prescribed. She is also due for a check up with me in approximately 1 month as well. Please facilitate in scheduling. Thank you!

## 2025-01-26 NOTE — PROGRESS NOTES
Hello,   Can you please call this patient, concerning pending labs.  She also needs an appointment in 2-3 weeks. Please facilitate.     Thanks!

## 2025-01-30 ENCOUNTER — RESULTS FOLLOW-UP (OUTPATIENT)
Dept: FAMILY MEDICINE CLINIC | Facility: CLINIC | Age: 60
End: 2025-01-30

## 2025-01-30 ENCOUNTER — HOSPITAL ENCOUNTER (OUTPATIENT)
Dept: MAMMOGRAPHY | Facility: CLINIC | Age: 60
End: 2025-01-30
Payer: COMMERCIAL

## 2025-01-30 VITALS — BODY MASS INDEX: 30.7 KG/M2 | WEIGHT: 191 LBS | HEIGHT: 66 IN

## 2025-01-30 DIAGNOSIS — R92.8 ABNORMAL SCREENING MAMMOGRAM: ICD-10-CM

## 2025-01-30 PROCEDURE — 77065 DX MAMMO INCL CAD UNI: CPT

## 2025-01-30 PROCEDURE — G0279 TOMOSYNTHESIS, MAMMO: HCPCS

## 2025-01-30 PROCEDURE — 76642 ULTRASOUND BREAST LIMITED: CPT

## 2025-02-06 NOTE — PROGRESS NOTES
first attempt to contact patient. left message to return my call on answering machine. Please assist in scheduling a f/u HTN and informing pt to complete pending labs.

## 2025-02-12 ENCOUNTER — OFFICE VISIT (OUTPATIENT)
Dept: FAMILY MEDICINE CLINIC | Facility: CLINIC | Age: 60
End: 2025-02-12

## 2025-02-12 VITALS
OXYGEN SATURATION: 96 % | WEIGHT: 197 LBS | HEART RATE: 105 BPM | TEMPERATURE: 100.1 F | DIASTOLIC BLOOD PRESSURE: 80 MMHG | SYSTOLIC BLOOD PRESSURE: 140 MMHG | HEIGHT: 66 IN | RESPIRATION RATE: 18 BRPM | BODY MASS INDEX: 31.66 KG/M2

## 2025-02-12 DIAGNOSIS — J11.1 INFLUENZA: Primary | ICD-10-CM

## 2025-02-12 LAB
SARS-COV-2 AG UPPER RESP QL IA: NEGATIVE
SL AMB POCT RAPID FLU A: POSITIVE
SL AMB POCT RAPID FLU B: NEGATIVE
VALID CONTROL: NORMAL

## 2025-02-12 PROCEDURE — 87811 SARS-COV-2 COVID19 W/OPTIC: CPT

## 2025-02-12 PROCEDURE — 87804 INFLUENZA ASSAY W/OPTIC: CPT

## 2025-02-12 PROCEDURE — 99213 OFFICE O/P EST LOW 20 MIN: CPT

## 2025-02-12 RX ORDER — FLUTICASONE PROPIONATE 50 MCG
1 SPRAY, SUSPENSION (ML) NASAL DAILY
Qty: 18.2 ML | Refills: 0 | Status: SHIPPED | OUTPATIENT
Start: 2025-02-12

## 2025-02-12 RX ORDER — OSELTAMIVIR PHOSPHATE 75 MG/1
75 CAPSULE ORAL EVERY 12 HOURS SCHEDULED
Qty: 10 CAPSULE | Refills: 0 | Status: SHIPPED | OUTPATIENT
Start: 2025-02-12 | End: 2025-02-17

## 2025-02-12 RX ORDER — ACETAMINOPHEN 500 MG
500 TABLET ORAL EVERY 6 HOURS PRN
Qty: 30 TABLET | Refills: 0 | Status: SHIPPED | OUTPATIENT
Start: 2025-02-12

## 2025-02-12 RX ORDER — IBUPROFEN 600 MG/1
600 TABLET, FILM COATED ORAL EVERY 6 HOURS PRN
Qty: 30 TABLET | Refills: 0 | Status: SHIPPED | OUTPATIENT
Start: 2025-02-12

## 2025-02-12 NOTE — PROGRESS NOTES
Name: Elizabet Martinez      : 1965      MRN: [id number]  Encounter Provider: René Long MD  Encounter Date: 2025   Encounter department: [facility]    Assessment & Plan  Influenza  Positive for Rapid Influenza A.Patient reports symptoms started less than 48 hours prior.   Patient advised to follow treatment as prescribed.   Patient encouraged to use Symptomatic/Conservative therapy such honey for sorethroat and cough, staying well hydrated, rest/relaxation and etc.  Work note signed  Patient advised call the office and/or present to the ED if the listed symptoms above, worsen or fail to improve as anticipated.  Patient given education material.   Orders:    POCT Rapid Covid Ag    POCT rapid flu A and B    oseltamivir (TAMIFLU) 75 mg capsule; Take 1 capsule (75 mg total) by mouth every 12 (twelve) hours for 5 days    ibuprofen (MOTRIN) 600 mg tablet; Take 1 tablet (600 mg total) by mouth every 6 (six) hours as needed for mild pain    acetaminophen (TYLENOL) 500 mg tablet; Take 1 tablet (500 mg total) by mouth every 6 (six) hours as needed for mild pain, fever or moderate pain    fluticasone (FLONASE) 50 mcg/act nasal spray; 1 spray into each nostril daily         History of Present Illness       URI   This is a new problem. Episode onset: Monday night. The problem has been gradually worsening. Maximum temperature: Unable to measure at home, feels feverish. The fever has been present for 1 to 2 days. Associated symptoms include coughing, headaches, joint pain, rhinorrhea, sinus pain and a sore throat (dry). Pertinent negatives include no abdominal pain, chest pain, congestion, diarrhea, dysuria, ear pain, joint swelling, nausea, neck pain, plugged ear sensation, rash, sneezing, swollen glands, vomiting or wheezing. Associated symptoms comments: Pain in back. She has tried increased fluids and NSAIDs for the symptoms. The treatment provided no relief.        Review of Systems   Constitutional:  Positive for fatigue and fever. Negative for chills and diaphoresis.   HENT:  Positive for postnasal drip, rhinorrhea, sinus pressure, sinus pain and sore throat (dry). Negative for congestion, ear pain, sneezing, tinnitus and trouble swallowing.    Eyes:  Negative for visual disturbance.   Respiratory:  Positive for cough. Negative for shortness of breath and wheezing.    Cardiovascular:  Negative for chest pain, palpitations and leg swelling.   Gastrointestinal:  Negative for abdominal pain, constipation, diarrhea, nausea and vomiting.   Genitourinary:  Negative for dysuria.   Musculoskeletal:  Positive for arthralgias, back pain, joint pain and myalgias. Negative for gait problem, joint swelling and neck pain.   Skin:  Negative for color change and rash.   Neurological:  Positive for headaches. Negative for dizziness, weakness and light-headedness.   Hematological:  Negative for adenopathy.     Past Medical History:   Diagnosis Date    Hypertension      No past surgical history on file.  Family History   Problem Relation Age of Onset    No Known Problems Mother     No Known Problems Father     No Known Problems Sister     No Known Problems Daughter     No Known Problems Maternal Grandmother     No Known Problems Maternal Grandfather     No Known Problems Paternal Grandmother     No Known Problems Paternal Grandfather     No Known Problems Maternal Aunt     No Known Problems Maternal Aunt     No Known Problems Maternal Aunt     No Known Problems Maternal Aunt     No Known Problems Maternal Aunt     No Known Problems Paternal Aunt     No Known Problems Paternal Aunt     Breast cancer Neg Hx     BRCA2 Positive Neg Hx     BRCA2 Negative Neg Hx     BRCA1 Positive Neg Hx     BRCA1 Negative Neg Hx     BRCA 1/2 Neg Hx     Ovarian cancer Neg Hx     Endometrial cancer Neg Hx     Colon cancer Neg Hx     Breast cancer additional onset Neg Hx      Social History     Tobacco Use     "Smoking status: Never     Passive exposure: Never    Smokeless tobacco: Never   Vaping Use    Vaping status: Never Used   Substance and Sexual Activity    Alcohol use: Never    Drug use: Never    Sexual activity: Not on file     Current Outpatient Medications on File Prior to Visit   Medication Sig    Blood Pressure KIT Use in the morning    hydroCHLOROthiazide 25 mg tablet TAKE 1 TABLET(25 MG) BY MOUTH DAILY    hydrocortisone 2.5 % ointment Apply topically 2 (two) times a day Apply to affected area    losartan (COZAAR) 50 mg tablet TAKE 1 TABLET(50 MG) BY MOUTH DAILY     Allergies   Allergen Reactions    Pineapple - Food Allergy Lip Swelling    Shrimp (Diagnostic) - Food Allergy Swelling     Immunization History   Administered Date(s) Administered    COVID-19 PFIZER VACCINE 0.3 ML IM 11/12/2021, 12/03/2021     Objective   /80 (BP Location: Right arm, Patient Position: Sitting, Cuff Size: Standard)   Pulse 105   Temp 100.1 °F (37.8 °C) (Temporal)   Resp 18   Ht 5' 6\" (1.676 m)   Wt 89.4 kg (197 lb)   SpO2 96%   Breastfeeding No   BMI 31.80 kg/m²     Physical Exam  Vitals reviewed.   Constitutional:       General: She is not in acute distress.     Appearance: She is ill-appearing. She is not diaphoretic.   HENT:      Nose: Congestion and rhinorrhea present.   Eyes:      General: No scleral icterus.     Conjunctiva/sclera: Conjunctivae normal.      Pupils: Pupils are equal, round, and reactive to light.   Cardiovascular:      Rate and Rhythm: Normal rate and regular rhythm.      Pulses: Normal pulses.      Heart sounds: Normal heart sounds.   Pulmonary:      Effort: Pulmonary effort is normal.      Breath sounds: Normal breath sounds.   Abdominal:      General: Bowel sounds are normal.   Musculoskeletal:      Cervical back: Normal range of motion.   Skin:     General: Skin is warm and dry.      Capillary Refill: Capillary refill takes less than 2 seconds.   Neurological:      Mental Status: She is alert " and oriented to person, place, and time. Mental status is at baseline.   Psychiatric:         Mood and Affect: Mood normal.         Behavior: Behavior normal.

## 2025-02-12 NOTE — LETTER
February 12, 2025     Patient: Elizabet Martinez  YOB: 1965  Date of Visit: 2/12/2025      To Whom it May Concern:    Elizabet Martinez is under my professional care. Elizabet was seen in my office on 2/12/2025. Elizabet may return to work on 2/16/25 .    If you have any questions or concerns, please don't hesitate to call.         Sincerely,          René Long MD        CC: No Recipients

## 2025-02-21 ENCOUNTER — APPOINTMENT (OUTPATIENT)
Dept: LAB | Facility: CLINIC | Age: 60
End: 2025-02-21
Payer: COMMERCIAL

## 2025-02-21 ENCOUNTER — OFFICE VISIT (OUTPATIENT)
Dept: FAMILY MEDICINE CLINIC | Facility: CLINIC | Age: 60
End: 2025-02-21

## 2025-02-21 VITALS
WEIGHT: 194.6 LBS | OXYGEN SATURATION: 96 % | BODY MASS INDEX: 31.27 KG/M2 | TEMPERATURE: 96.1 F | HEART RATE: 78 BPM | RESPIRATION RATE: 14 BRPM | SYSTOLIC BLOOD PRESSURE: 130 MMHG | DIASTOLIC BLOOD PRESSURE: 80 MMHG | HEIGHT: 66 IN

## 2025-02-21 DIAGNOSIS — I10 PRIMARY HYPERTENSION: Primary | ICD-10-CM

## 2025-02-21 DIAGNOSIS — R73.03 PREDIABETES: ICD-10-CM

## 2025-02-21 DIAGNOSIS — R06.02 SHORTNESS OF BREATH: ICD-10-CM

## 2025-02-21 DIAGNOSIS — E55.9 VITAMIN D DEFICIENCY: ICD-10-CM

## 2025-02-21 DIAGNOSIS — I10 PRIMARY HYPERTENSION: ICD-10-CM

## 2025-02-21 PROBLEM — R21 RASH: Status: RESOLVED | Noted: 2022-11-18 | Resolved: 2025-02-21

## 2025-02-21 LAB
25(OH)D3 SERPL-MCNC: 13.9 NG/ML (ref 30–100)
ALBUMIN SERPL BCG-MCNC: 4.1 G/DL (ref 3.5–5)
ALP SERPL-CCNC: 144 U/L (ref 34–104)
ALT SERPL W P-5'-P-CCNC: 28 U/L (ref 7–52)
ANION GAP SERPL CALCULATED.3IONS-SCNC: 11 MMOL/L (ref 4–13)
AST SERPL W P-5'-P-CCNC: 26 U/L (ref 13–39)
BILIRUB SERPL-MCNC: 0.57 MG/DL (ref 0.2–1)
BUN SERPL-MCNC: 13 MG/DL (ref 5–25)
CALCIUM SERPL-MCNC: 9.5 MG/DL (ref 8.4–10.2)
CHLORIDE SERPL-SCNC: 102 MMOL/L (ref 96–108)
CO2 SERPL-SCNC: 26 MMOL/L (ref 21–32)
CREAT SERPL-MCNC: 0.72 MG/DL (ref 0.6–1.3)
CREAT UR-MCNC: 118.7 MG/DL
GFR SERPL CREATININE-BSD FRML MDRD: 92 ML/MIN/1.73SQ M
GLUCOSE P FAST SERPL-MCNC: 88 MG/DL (ref 65–99)
MICROALBUMIN UR-MCNC: 18.5 MG/L
MICROALBUMIN/CREAT 24H UR: 16 MG/G CREATININE (ref 0–30)
POTASSIUM SERPL-SCNC: 3.8 MMOL/L (ref 3.5–5.3)
PROT SERPL-MCNC: 8.1 G/DL (ref 6.4–8.4)
SODIUM SERPL-SCNC: 139 MMOL/L (ref 135–147)

## 2025-02-21 PROCEDURE — 82306 VITAMIN D 25 HYDROXY: CPT

## 2025-02-21 PROCEDURE — 99213 OFFICE O/P EST LOW 20 MIN: CPT

## 2025-02-21 PROCEDURE — 80053 COMPREHEN METABOLIC PANEL: CPT

## 2025-02-21 PROCEDURE — 36415 COLL VENOUS BLD VENIPUNCTURE: CPT

## 2025-02-21 NOTE — PROGRESS NOTES
Name: Elizabet Martinez      : 1965      MRN: [id number]  Encounter Provider: Ela Gong MD  Encounter Date: 2025   Encounter department: [facility]  :  Assessment & Plan  Primary hypertension    BP Readings from Last 3 Encounters:   25 130/80   25 140/80   24 140/80     Controlled for age   Current medications: hctz 25 mg, losartan  50 mg daily   Compliant     Plan:   Continue current regimen  FU with previously ordered labs   Patient instructed to decrease consumption of fried/process/ fast foods and increase whole food intake   Goal of 3-5 servings of vegetables per day   Minimize salt to < 2g per day   Portion control   Goal of exercise 150 minutes per week of exercise, or 30 minute of brisk exercise 5x/ week but increase activity slowly (SMART goal)  to get to goal.            Vitamin D deficiency  Last vitamin D 17 2023  Currently not taking any medications     Plan:   FU with previously ordered vitamin D           Prediabetes  Lab Results   Component Value Date    HGBA1C 6.0 2024     Last A1c: 5.6   No medications currently      Plan:   Diet and exercise counseling   Can check in 3 months (2025)           Shortness of breath  With dancing experiences shortness of breath no palpitations or chest pain and SOB goes away with resting. No issues with normal activities. No specific time line, but has noticed it last  when dancing salsa.   No leg swelling   No syncope  Low cardiopulmonary risk at this point most likely deconditioning vs lingering effects of FLU virus     Plan:   FU in 1 month   Continue dancing and exercise as tolerated   ED precautions discussed            BMI Counseling: Body mass index is 31.41 kg/m². The BMI is above normal. Nutrition recommendations include decreasing portion sizes. Exercise recommendations include moderate physical activity 150 minutes/week. Rationale for BMI follow-up plan is due to  "patient being overweight or obese.       History of Present Illness   HPI  Elizabet Martinez 59 y.o. with past medical history significant for  HTN, vitamin D deficiency, pre-dm. Patient does complain of SOB that she has noticed over the past week. She feels she gets out of breath with dancing or exertion, but no distinct chest pain or presyncope, palpitations or dizziness. Has not had this before, but recently has been ill with the flu.     Review of Systems   Constitutional:  Negative for chills and fever.   HENT:  Positive for congestion.    Respiratory:  Positive for shortness of breath. Negative for cough, chest tightness and wheezing.    Cardiovascular:  Negative for chest pain and palpitations.   Gastrointestinal:  Negative for abdominal pain, constipation, diarrhea, rectal pain and vomiting.   Genitourinary:  Negative for dysuria and hematuria.   Neurological:  Negative for dizziness, syncope and light-headedness.   Psychiatric/Behavioral:  Negative for agitation and decreased concentration.        Objective   /80   Pulse 78   Temp (!) 96.1 °F (35.6 °C) (Temporal)   Resp 14   Ht 5' 6\" (1.676 m)   Wt 88.3 kg (194 lb 9.6 oz)   SpO2 96%   BMI 31.41 kg/m²      Physical Exam  Constitutional:       Appearance: Normal appearance. She is normal weight.   HENT:      Head: Normocephalic and atraumatic.      Right Ear: External ear normal.      Left Ear: External ear normal.      Nose: Congestion present.      Mouth/Throat:      Mouth: Mucous membranes are moist.      Pharynx: No oropharyngeal exudate.   Eyes:      Extraocular Movements: Extraocular movements intact.      Conjunctiva/sclera: Conjunctivae normal.   Cardiovascular:      Rate and Rhythm: Normal rate and regular rhythm.      Pulses: Normal pulses.      Heart sounds: Normal heart sounds. No murmur heard.     No friction rub. No gallop.   Pulmonary:      Effort: Pulmonary effort is normal.      Breath sounds: Normal breath sounds.   Abdominal: "      Palpations: Abdomen is soft.      Tenderness: There is no abdominal tenderness.   Musculoskeletal:         General: No swelling or tenderness.      Right lower leg: No edema.      Left lower leg: No edema.   Skin:     General: Skin is warm.      Capillary Refill: Capillary refill takes less than 2 seconds.   Neurological:      General: No focal deficit present.      Mental Status: She is alert and oriented to person, place, and time.   Psychiatric:         Mood and Affect: Mood normal.         Behavior: Behavior normal.

## 2025-02-21 NOTE — ASSESSMENT & PLAN NOTE
Lab Results   Component Value Date    HGBA1C 6.0 11/13/2024     Last A1c: 5.6   No medications currently      Plan:   Diet and exercise counseling   Can check in 3 months (5/2025)

## 2025-02-21 NOTE — ASSESSMENT & PLAN NOTE
BP Readings from Last 3 Encounters:   02/21/25 130/80   02/12/25 140/80   11/13/24 140/80     Controlled for age   Current medications: hctz 25 mg, losartan  50 mg daily   Compliant     Plan:   Continue current regimen  FU with previously ordered labs   Patient instructed to decrease consumption of fried/process/ fast foods and increase whole food intake   Goal of 3-5 servings of vegetables per day   Minimize salt to < 2g per day   Portion control   Goal of exercise 150 minutes per week of exercise, or 30 minute of brisk exercise 5x/ week but increase activity slowly (SMART goal)  to get to goal.

## 2025-02-23 ENCOUNTER — TELEPHONE (OUTPATIENT)
Dept: OTHER | Facility: HOSPITAL | Age: 60
End: 2025-02-23

## 2025-02-23 DIAGNOSIS — E55.9 VITAMIN D DEFICIENCY: Primary | ICD-10-CM

## 2025-02-23 RX ORDER — ERGOCALCIFEROL 1.25 MG/1
50000 CAPSULE ORAL WEEKLY
Qty: 12 CAPSULE | Refills: 0 | Status: SHIPPED | OUTPATIENT
Start: 2025-02-23

## 2025-02-23 NOTE — TELEPHONE ENCOUNTER
Called patient using [place] interpretor. [id number]. I informed her of her vitamin D being very low and needing to use 3 months of weekly vitamin D and to recheck the levels in 3 months. Patient understood and was amenable to the plan going forward. All questions were answered to satisfaction and patient was grateful for the call.

## 2025-04-09 NOTE — PROGRESS NOTES
Name: Elizabet Martinez      : 1965      MRN: [id number]  Encounter Provider: Ela Gong MD  Encounter Date: 4/10/2025   Encounter department: [facility]  :  Assessment & Plan  Primary hypertension  BP Readings from Last 3 Encounters:   04/10/25 152/98   25 130/80   25 140/80       Uncontrolled currently.   Current medications: losartan 50mg daily   and hydrochlorothiazide 25 mg daily.  Not adherent. Has not taken for the last 3 days.   Asymptomatic   Has refills, but often forgets to take.   Patient does endorse anxiety today surrounding events in [place] where roof collapse and over a hundred people .     Plan:   FU in 2 week for BP check   Continue current regimen  Explained importance of compliance and brief education on risks of untreated HTN.  Discussed putting it in a good place where she can remember . And setting alarm on phone.  Ordered Bp cuff and instructed to take log to review at next visit.  If BP > 180/110 and symtpomatic got to ED   Patient instructed to decrease consumption of fried/process/ fast foods and increase whole food intake   Goal of 3-5 servings of vegetables per day   Minimize salt to < 2g per day   Portion control   Goal of exercise 150 minutes per week of exercise, or 30 minute of brisk exercise 5x/ week but increase activity slowly (SMART goal)  to get to goal.          Orders:    hydroCHLOROthiazide 25 mg tablet; Take 1 tablet (25 mg total) by mouth daily    losartan (COZAAR) 50 mg tablet; Take 1 tablet (50 mg total) by mouth daily    Blood Pressure Monitoring (Blood Pressure Cuff) MISC; Use in the morning    Shortness of breath  Improved , very minor and not bothersome with exertion.   Most likely from flu vs deconditioning.     Plan:   Continue to monitor        Blurry vision, bilateral  Both eyes   Describes dizziness that may occur with head movements, room spinning, and last for a few seconds and is self limiting. No  "chest symptoms during these episodes. No syncope.   Has been going on for a while, cannot pinpoint how long  Does not use glasses.   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    Plan:   Continue to monitor   ED precautions discussed              BMI Counseling: Body mass index is 31.47 kg/m². The BMI is above normal. Nutrition recommendations include decreasing portion sizes. Exercise recommendations include moderate physical activity 150 minutes/week. Rationale for BMI follow-up plan is due to patient being overweight or obese.       History of Present Illness   HPI  Elizabet Martinez 59 y.o. with past medical history significant for  HTN, vitamin D deficiency, pre-dm. Patient has not been having her BP medications because she has been forgetting it. Has not taken BP meds for the past 3 days.       Review of Systems   Constitutional:  Negative for chills and fever.   HENT:  Negative for congestion, hearing loss, sinus pressure and tinnitus.    Eyes:  Positive for visual disturbance.   Respiratory:  Negative for cough, chest tightness, shortness of breath and wheezing.    Cardiovascular:  Negative for chest pain and palpitations.   Gastrointestinal:  Negative for abdominal pain, constipation, diarrhea, rectal pain and vomiting.   Genitourinary:  Negative for dysuria and hematuria.   Neurological:  Negative for dizziness, syncope and light-headedness.   Psychiatric/Behavioral:  Negative for agitation and decreased concentration.        Objective   /98 (BP Location: Right arm, Patient Position: Sitting, Cuff Size: Large)   Pulse 83   Temp 98.5 °F (36.9 °C) (Temporal)   Resp 18   Ht 5' 6\" (1.676 m)   Wt 88.5 kg (195 lb)   SpO2 98%   Breastfeeding No   BMI 31.47 kg/m²      Physical Exam  Constitutional:       Appearance: Normal appearance. She is normal weight.   HENT:      Head: Normocephalic and atraumatic.      Right Ear: " External ear normal.      Left Ear: External ear normal.      Nose: No congestion.      Mouth/Throat:      Mouth: Mucous membranes are moist.      Pharynx: No oropharyngeal exudate.   Eyes:      Extraocular Movements: Extraocular movements intact.      Conjunctiva/sclera: Conjunctivae normal.   Neck:      Vascular: No carotid bruit.   Cardiovascular:      Rate and Rhythm: Normal rate and regular rhythm.      Pulses: Normal pulses.      Heart sounds: Normal heart sounds. No murmur heard.     No friction rub. No gallop.   Pulmonary:      Effort: Pulmonary effort is normal.      Breath sounds: Normal breath sounds.   Abdominal:      Palpations: Abdomen is soft.      Tenderness: There is no abdominal tenderness.   Musculoskeletal:         General: No swelling or tenderness.      Right lower leg: No edema.      Left lower leg: No edema.   Skin:     General: Skin is warm.      Capillary Refill: Capillary refill takes less than 2 seconds.   Neurological:      General: No focal deficit present.      Mental Status: She is alert and oriented to person, place, and time.   Psychiatric:         Mood and Affect: Mood normal.         Behavior: Behavior normal.

## 2025-04-10 ENCOUNTER — OFFICE VISIT (OUTPATIENT)
Dept: FAMILY MEDICINE CLINIC | Facility: CLINIC | Age: 60
End: 2025-04-10

## 2025-04-10 VITALS
SYSTOLIC BLOOD PRESSURE: 170 MMHG | DIASTOLIC BLOOD PRESSURE: 100 MMHG | HEIGHT: 66 IN | OXYGEN SATURATION: 98 % | WEIGHT: 195 LBS | RESPIRATION RATE: 18 BRPM | HEART RATE: 83 BPM | TEMPERATURE: 98.5 F | BODY MASS INDEX: 31.34 KG/M2

## 2025-04-10 DIAGNOSIS — I10 PRIMARY HYPERTENSION: Primary | ICD-10-CM

## 2025-04-10 DIAGNOSIS — R06.02 SHORTNESS OF BREATH: ICD-10-CM

## 2025-04-10 DIAGNOSIS — H53.8 BLURRY VISION, BILATERAL: ICD-10-CM

## 2025-04-10 PROCEDURE — 99213 OFFICE O/P EST LOW 20 MIN: CPT | Performed by: FAMILY MEDICINE

## 2025-04-10 RX ORDER — HYDROCHLOROTHIAZIDE 25 MG/1
25 TABLET ORAL DAILY
Qty: 60 TABLET | Refills: 1 | Status: SHIPPED | OUTPATIENT
Start: 2025-04-10

## 2025-04-10 RX ORDER — LOSARTAN POTASSIUM 50 MG/1
50 TABLET ORAL DAILY
Qty: 60 TABLET | Refills: 1 | Status: SHIPPED | OUTPATIENT
Start: 2025-04-10

## 2025-04-10 RX ORDER — ADHESIVE BANDAGE 3/4"
BANDAGE TOPICAL DAILY
Qty: 1 EACH | Refills: 0 | Status: SHIPPED | OUTPATIENT
Start: 2025-04-10

## 2025-04-10 NOTE — ASSESSMENT & PLAN NOTE
Improved , very minor and not bothersome with exertion.   Most likely from flu vs deconditioning.     Plan:   Continue to monitor

## 2025-04-10 NOTE — ASSESSMENT & PLAN NOTE
BP Readings from Last 3 Encounters:   04/10/25 152/98   25 130/80   25 140/80       Uncontrolled currently.   Current medications: losartan 50mg daily   and hydrochlorothiazide 25 mg daily.  Not adherent. Has not taken for the last 3 days.   Asymptomatic   Has refills, but often forgets to take.   Patient does endorse anxiety today surrounding events in [place] where roof collapse and over a hundred people .     Plan:   FU in 2 week for BP check   Continue current regimen  Explained importance of compliance and brief education on risks of untreated HTN.  Discussed putting it in a good place where she can remember . And setting alarm on phone.  Ordered Bp cuff and instructed to take log to review at next visit.  If BP > 180/110 and symtpomatic got to ED   Patient instructed to decrease consumption of fried/process/ fast foods and increase whole food intake   Goal of 3-5 servings of vegetables per day   Minimize salt to < 2g per day   Portion control   Goal of exercise 150 minutes per week of exercise, or 30 minute of brisk exercise 5x/ week but increase activity slowly (SMART goal)  to get to goal.          Orders:    hydroCHLOROthiazide 25 mg tablet; Take 1 tablet (25 mg total) by mouth daily    losartan (COZAAR) 50 mg tablet; Take 1 tablet (50 mg total) by mouth daily    Blood Pressure Monitoring (Blood Pressure Cuff) MISC; Use in the morning

## 2025-04-10 NOTE — PATIENT INSTRUCTIONS
Take losartan 50 mg and hydrochlorothiazide 25 mg  Go to ED if having blood pressure that is very high >180-110   FU in 2 weeks

## 2025-04-21 NOTE — ASSESSMENT & PLAN NOTE
BP Readings from Last 3 Encounters:   25 118/80   04/10/25 170/100   25 130/80       Currently controlled   Current medications: losartan 50mg daily   and hydrochlorothiazide 25 mg daily.  Not adherent. Has not taken for the last 3 days.   Asymptomatic   Has refills, but often forgets to take.   Patient does endorse anxiety today surrounding events in [place] where roof collapse and over a hundred people .     Plan:   FU in 2 week for BP check   Continue current regimen  Explained importance of compliance and brief education on risks of untreated HTN.  Discussed putting it in a good place where she can remember . And setting alarm on phone.  Ordered Bp cuff and instructed to take log to review at next visit.  If BP > 180/110 and symtpomatic got to ED   Patient instructed to decrease consumption of fried/process/ fast foods and increase whole food intake   Goal of 3-5 servings of vegetables per day   Minimize salt to < 2g per day   Portion control   Goal of exercise 150 minutes per week of exercise, or 30 minute of brisk exercise 5x/ week but increase activity slowly (SMART goal)  to get to goal.

## 2025-04-24 ENCOUNTER — APPOINTMENT (OUTPATIENT)
Dept: LAB | Facility: CLINIC | Age: 60
End: 2025-04-24
Payer: COMMERCIAL

## 2025-04-24 ENCOUNTER — OFFICE VISIT (OUTPATIENT)
Dept: FAMILY MEDICINE CLINIC | Facility: CLINIC | Age: 60
End: 2025-04-24

## 2025-04-24 VITALS
DIASTOLIC BLOOD PRESSURE: 80 MMHG | TEMPERATURE: 98 F | OXYGEN SATURATION: 98 % | RESPIRATION RATE: 16 BRPM | SYSTOLIC BLOOD PRESSURE: 118 MMHG | BODY MASS INDEX: 30.7 KG/M2 | HEIGHT: 66 IN | HEART RATE: 80 BPM | WEIGHT: 191 LBS

## 2025-04-24 DIAGNOSIS — I10 PRIMARY HYPERTENSION: Primary | ICD-10-CM

## 2025-04-24 DIAGNOSIS — Z12.4 PAP SMEAR FOR CERVICAL CANCER SCREENING: ICD-10-CM

## 2025-04-24 DIAGNOSIS — G47.9 SLEEP DISTURBANCE: ICD-10-CM

## 2025-04-24 DIAGNOSIS — E55.9 VITAMIN D DEFICIENCY: ICD-10-CM

## 2025-04-24 LAB — 25(OH)D3 SERPL-MCNC: 30.3 NG/ML (ref 30–100)

## 2025-04-24 PROCEDURE — 82306 VITAMIN D 25 HYDROXY: CPT

## 2025-04-24 PROCEDURE — 36415 COLL VENOUS BLD VENIPUNCTURE: CPT

## 2025-04-24 NOTE — ASSESSMENT & PLAN NOTE
Works at [employer] and her hours are 1-5;30 and 6-8,  or 9-1am usually 2 sheets per day.   Sometimes get home around 12 - 1 am on some days.   Patient cannot estimate how much sleep she gets but feels she has energy in the day to work well.     Plan:  Sleep hygiene education

## 2025-04-24 NOTE — PROGRESS NOTES
Adult Annual Physical  Name: Elizabet Martinez      : 1965      MRN: [id number]  Encounter Provider: Ela Gong MD  Encounter Date: 2025   Encounter department: [facility]    :  Assessment & Plan  Primary hypertension  BP Readings from Last 3 Encounters:   25 118/80   04/10/25 170/100   25 130/80       Currently controlled   Current medications: losartan 50mg daily   and hydrochlorothiazide 25 mg daily.  Not adherent. Has not taken for the last 3 days.   Asymptomatic   Has refills, but often forgets to take.   Patient does endorse anxiety today surrounding events in [place] where roof collapse and over a hundred people .     Plan:   FU in 2 week for BP check   Continue current regimen  Explained importance of compliance and brief education on risks of untreated HTN.  Discussed putting it in a good place where she can remember . And setting alarm on phone.  Ordered Bp cuff and instructed to take log to review at next visit.  If BP > 180/110 and symtpomatic got to ED   Patient instructed to decrease consumption of fried/process/ fast foods and increase whole food intake   Goal of 3-5 servings of vegetables per day   Minimize salt to < 2g per day   Portion control   Goal of exercise 150 minutes per week of exercise, or 30 minute of brisk exercise 5x/ week but increase activity slowly (SMART goal)  to get to goal.                 Vitamin D deficiency  Vit d: 13.9 (2025)  Patient has been taking the pills. And has 1 left     Plan:   Continue vitamin D 50,000 U weekly  for the 12 month course   Recheck vitamin D now     Orders:    Vitamin D 25 hydroxy; Future    Sleep disturbance  Works at [employer] and her hours are 1-5;30 and 6-8,  or 9-1am usually 2 sheets per day.   Sometimes get home around 12 - 1 am on some days.   Patient cannot estimate how much sleep she gets but feels she has energy in the day to work well.     Plan:  Sleep hygiene education           Pap smear for cervical cancer screening  5/14/24 - negative HPV and pap smear  next pap -5/2029             Preventive Screenings:  - Diabetes Screening: screening up-to-date  - Hepatitis C screening: screening up-to-date   - HIV screening: screening up-to-date   - Cervical cancer screening: screening up-to-date   - Breast cancer screening: screening up-to-date   - Colon cancer screening: screening up-to-date   - Lung cancer screening: screening not indicated     Immunizations:  - Immunizations due: Influenza, Tdap and Zoster (Shingrix)      Depression Screening and Follow-up Plan: Patient was screened for depression during today's encounter. They screened negative with a PHQ-2 score of 0.          History of Present Illness     Adult Annual Physical:  Patient presents for annual physical.     Diet and Physical Activity:  - Diet/Nutrition: no special diet and consuming 3-5 servings of fruits/vegetables daily.  - Exercise: no formal exercise.    Depression Screening:  - PHQ-2 Score: 0    General Health:  - Sleep:. patient cannot estimate.  - Hearing: normal hearing right ear.  - Vision: no vision problems.  - Dental: regular dental visits, brushes teeth twice daily and does not floss.    /GYN Health:  - Follows with GYN: no.   - Menopause: postmenopausal.   - Last menstrual cycle: 3/28/2005.   - History of STDs: no  - Contraception:. not sexually active      Review of Systems   Constitutional:  Negative for appetite change, chills and fever.   Respiratory:  Negative for chest tightness, shortness of breath and wheezing.    Cardiovascular:  Negative for chest pain and palpitations.   Gastrointestinal:  Negative for abdominal pain, constipation, diarrhea and vomiting.   Genitourinary:  Negative for difficulty urinating, dysuria, pelvic pain, vaginal bleeding and vaginal discharge.         Objective   /80 (BP Location: Right arm, Patient Position: Sitting, Cuff Size: Standard)   Pulse 80   Temp 98 °F (36.7  "°C) (Temporal)   Resp 16   Ht 5' 6\" (1.676 m)   Wt 86.6 kg (191 lb)   SpO2 98%   BMI 30.83 kg/m²     Physical Exam  Constitutional:       Appearance: Normal appearance. She is normal weight.   HENT:      Head: Normocephalic and atraumatic.      Right Ear: External ear normal.      Left Ear: External ear normal.      Mouth/Throat:      Mouth: Mucous membranes are moist.   Eyes:      General:         Right eye: No discharge.         Left eye: No discharge.      Extraocular Movements: Extraocular movements intact.      Conjunctiva/sclera: Conjunctivae normal.   Cardiovascular:      Rate and Rhythm: Normal rate.      Pulses: Normal pulses.      Heart sounds: Normal heart sounds.   Pulmonary:      Effort: Pulmonary effort is normal.      Breath sounds: Normal breath sounds.   Abdominal:      General: Bowel sounds are normal.      Palpations: Abdomen is soft.      Tenderness: There is no abdominal tenderness.   Musculoskeletal:         General: No swelling. Normal range of motion.      Cervical back: Normal range of motion.      Right lower leg: No edema.      Left lower leg: No edema.   Skin:     General: Skin is warm.      Capillary Refill: Capillary refill takes less than 2 seconds.   Neurological:      General: No focal deficit present.      Mental Status: She is alert and oriented to person, place, and time.   Psychiatric:         Mood and Affect: Mood normal.         "

## 2025-04-24 NOTE — ASSESSMENT & PLAN NOTE
Vit d: 13.9 (02/2025)  Patient has been taking the pills. And has 1 left     Plan:   Continue vitamin D 50,000 U weekly  for the 12 month course   Recheck vitamin D now     Orders:    Vitamin D 25 hydroxy; Future

## 2025-04-25 ENCOUNTER — RESULTS FOLLOW-UP (OUTPATIENT)
Dept: FAMILY MEDICINE CLINIC | Facility: CLINIC | Age: 60
End: 2025-04-25

## 2025-04-25 NOTE — RESULT ENCOUNTER NOTE
Hello   Please let this patient know that her vitamin D is much improved. She can finish the rest of her  weekly vitamin D, but should continue taking vitamin D 2,000 U per day which can be purchased over the counter.     Thank you.

## 2025-04-25 NOTE — LETTER
05/01/25    Elizabet Juan      1965 [address]  [place] [address]  [phone] (home)     Please contact our office at your convenience. It is important that we speak to you     Porfavor contacte a nuestra oficina.  Es muy importante que hablemos con usted     SOBRE:            Scheduling an Appointment/ Programar nathaniel Kasie          Rescheduling an Appointment/ Re-programar nathaniel Kasie     Cancelling an Appointment/Cancelar cross Kasie    xxx Your Lab/ X-ray Results/Resultados     xxx    Updating your Phone number or Address/ Actualizar cross [employer]           Verify your Primary Provider/ Verificar cross Proveedor primario     Other/Otro:    Please Contact Our Office to Schedule Your Appointment.  It is Very Important That You See Your Provider for your  Routine Medical Care. If you are seeing a New Providor,  Please Contact our Office so we can update our Records.                                                                       Thank You.    Comuníquese con nuestra oficina para programar cross kasie.  Es Muy Importante que usted james cross proveedor para cross   [facility] [employer] [facility]. Si usted esta viendo un Proveedor  [place], comuníquese con nuestra oficina para actualizar   nuestro registros.        Camron.